# Patient Record
Sex: FEMALE | Race: WHITE | NOT HISPANIC OR LATINO | Employment: OTHER | URBAN - METROPOLITAN AREA
[De-identification: names, ages, dates, MRNs, and addresses within clinical notes are randomized per-mention and may not be internally consistent; named-entity substitution may affect disease eponyms.]

---

## 2022-04-19 ENCOUNTER — TRANSCRIBE ORDERS (OUTPATIENT)
Dept: CARDIAC REHAB | Facility: HOSPITAL | Age: 67
End: 2022-04-19
Payer: COMMERCIAL

## 2022-04-19 DIAGNOSIS — J44.9 COPD (CHRONIC OBSTRUCTIVE PULMONARY DISEASE) (H): Primary | ICD-10-CM

## 2022-04-20 ENCOUNTER — HOSPITAL ENCOUNTER (OUTPATIENT)
Dept: CARDIAC REHAB | Facility: HOSPITAL | Age: 67
Discharge: HOME | End: 2022-04-20
Attending: INTERNAL MEDICINE
Payer: MEDICARE

## 2022-04-20 DIAGNOSIS — J44.9 COPD (CHRONIC OBSTRUCTIVE PULMONARY DISEASE) (H): ICD-10-CM

## 2022-04-20 PROCEDURE — 94626 PHY/QHP OP PULM RHB W/MNTR: CPT

## 2022-04-26 ENCOUNTER — HOSPITAL ENCOUNTER (OUTPATIENT)
Dept: CARDIAC REHAB | Facility: HOSPITAL | Age: 67
Discharge: HOME | End: 2022-04-26
Attending: INTERNAL MEDICINE
Payer: MEDICARE

## 2022-04-26 PROCEDURE — 94625 PHY/QHP OP PULM RHB W/O MNTR: CPT

## 2022-05-03 ENCOUNTER — HOSPITAL ENCOUNTER (OUTPATIENT)
Dept: CARDIAC REHAB | Facility: HOSPITAL | Age: 67
Discharge: HOME | End: 2022-05-03
Attending: INTERNAL MEDICINE
Payer: MEDICARE

## 2022-05-03 PROCEDURE — 94625 PHY/QHP OP PULM RHB W/O MNTR: CPT

## 2022-05-05 ENCOUNTER — HOSPITAL ENCOUNTER (OUTPATIENT)
Dept: CARDIAC REHAB | Facility: HOSPITAL | Age: 67
Discharge: HOME | End: 2022-05-05
Attending: INTERNAL MEDICINE
Payer: MEDICARE

## 2022-05-05 PROCEDURE — 94625 PHY/QHP OP PULM RHB W/O MNTR: CPT

## 2022-05-10 ENCOUNTER — HOSPITAL ENCOUNTER (OUTPATIENT)
Dept: CARDIAC REHAB | Facility: HOSPITAL | Age: 67
Discharge: HOME | End: 2022-05-10
Attending: INTERNAL MEDICINE
Payer: MEDICARE

## 2022-05-10 PROCEDURE — 94625 PHY/QHP OP PULM RHB W/O MNTR: CPT

## 2022-05-17 ENCOUNTER — HOSPITAL ENCOUNTER (OUTPATIENT)
Dept: CARDIAC REHAB | Facility: HOSPITAL | Age: 67
Discharge: HOME | End: 2022-05-17
Attending: INTERNAL MEDICINE
Payer: MEDICARE

## 2022-05-17 PROCEDURE — 94625 PHY/QHP OP PULM RHB W/O MNTR: CPT

## 2022-05-19 ENCOUNTER — HOSPITAL ENCOUNTER (OUTPATIENT)
Dept: CARDIAC REHAB | Facility: HOSPITAL | Age: 67
Discharge: HOME | End: 2022-05-19
Attending: INTERNAL MEDICINE
Payer: MEDICARE

## 2022-05-19 PROCEDURE — 94625 PHY/QHP OP PULM RHB W/O MNTR: CPT

## 2022-05-24 ENCOUNTER — HOSPITAL ENCOUNTER (OUTPATIENT)
Dept: CARDIAC REHAB | Facility: HOSPITAL | Age: 67
Discharge: HOME | End: 2022-05-24
Attending: INTERNAL MEDICINE
Payer: MEDICARE

## 2022-05-24 PROCEDURE — 94625 PHY/QHP OP PULM RHB W/O MNTR: CPT

## 2022-05-31 ENCOUNTER — HOSPITAL ENCOUNTER (OUTPATIENT)
Dept: CARDIAC REHAB | Facility: HOSPITAL | Age: 67
Discharge: HOME | End: 2022-05-31
Attending: INTERNAL MEDICINE
Payer: MEDICARE

## 2022-05-31 PROCEDURE — 94625 PHY/QHP OP PULM RHB W/O MNTR: CPT

## 2022-06-02 ENCOUNTER — HOSPITAL ENCOUNTER (OUTPATIENT)
Dept: CARDIAC REHAB | Facility: HOSPITAL | Age: 67
Discharge: HOME | End: 2022-06-02
Attending: INTERNAL MEDICINE
Payer: MEDICARE

## 2022-06-02 PROCEDURE — 94625 PHY/QHP OP PULM RHB W/O MNTR: CPT

## 2022-06-07 ENCOUNTER — HOSPITAL ENCOUNTER (OUTPATIENT)
Dept: CARDIAC REHAB | Facility: HOSPITAL | Age: 67
Discharge: HOME | End: 2022-06-07
Attending: INTERNAL MEDICINE
Payer: MEDICARE

## 2022-06-07 PROCEDURE — 94625 PHY/QHP OP PULM RHB W/O MNTR: CPT

## 2022-06-09 ENCOUNTER — HOSPITAL ENCOUNTER (OUTPATIENT)
Dept: CARDIAC REHAB | Facility: HOSPITAL | Age: 67
Discharge: HOME | End: 2022-06-09
Attending: INTERNAL MEDICINE
Payer: MEDICARE

## 2022-06-09 PROCEDURE — 94625 PHY/QHP OP PULM RHB W/O MNTR: CPT

## 2022-06-14 ENCOUNTER — HOSPITAL ENCOUNTER (OUTPATIENT)
Dept: CARDIAC REHAB | Facility: HOSPITAL | Age: 67
Discharge: HOME | End: 2022-06-14
Attending: INTERNAL MEDICINE
Payer: MEDICARE

## 2022-06-14 PROCEDURE — 94625 PHY/QHP OP PULM RHB W/O MNTR: CPT

## 2022-06-16 ENCOUNTER — HOSPITAL ENCOUNTER (OUTPATIENT)
Dept: CARDIAC REHAB | Facility: HOSPITAL | Age: 67
Discharge: HOME | End: 2022-06-16
Attending: INTERNAL MEDICINE
Payer: MEDICARE

## 2022-06-16 PROCEDURE — 94625 PHY/QHP OP PULM RHB W/O MNTR: CPT

## 2022-06-21 ENCOUNTER — HOSPITAL ENCOUNTER (OUTPATIENT)
Dept: CARDIAC REHAB | Facility: HOSPITAL | Age: 67
Discharge: HOME | End: 2022-06-21
Attending: INTERNAL MEDICINE
Payer: MEDICARE

## 2022-06-21 PROCEDURE — 94625 PHY/QHP OP PULM RHB W/O MNTR: CPT

## 2022-06-23 ENCOUNTER — MEDICAL CORRESPONDENCE (OUTPATIENT)
Dept: CARDIAC REHAB | Facility: HOSPITAL | Age: 67
End: 2022-06-23

## 2023-08-24 ENCOUNTER — HOSPITAL ENCOUNTER (INPATIENT)
Facility: HOSPITAL | Age: 68
LOS: 2 days | Discharge: HOME | DRG: 552 | End: 2023-08-28
Attending: EMERGENCY MEDICINE | Admitting: INTERNAL MEDICINE
Payer: MEDICARE

## 2023-08-24 ENCOUNTER — APPOINTMENT (OUTPATIENT)
Dept: CT IMAGING | Facility: HOSPITAL | Age: 68
DRG: 552 | End: 2023-08-24
Attending: INTERNAL MEDICINE
Payer: MEDICARE

## 2023-08-24 DIAGNOSIS — M54.50 ACUTE RIGHT-SIDED LOW BACK PAIN, UNSPECIFIED WHETHER SCIATICA PRESENT: ICD-10-CM

## 2023-08-24 DIAGNOSIS — Z13.6 CARDIOVASCULAR SCREENING; LDL GOAL LESS THAN 160: ICD-10-CM

## 2023-08-24 DIAGNOSIS — R52 INTRACTABLE PAIN: ICD-10-CM

## 2023-08-24 DIAGNOSIS — Z71.89 ADVANCED DIRECTIVES, COUNSELING/DISCUSSION: Primary | ICD-10-CM

## 2023-08-24 PROBLEM — R93.89 ABNORMAL RADIOGRAPH: Status: ACTIVE | Noted: 2023-08-24

## 2023-08-24 PROBLEM — R09.02 HYPOXIA: Status: ACTIVE | Noted: 2017-04-09

## 2023-08-24 PROBLEM — Z87.09 PERSONAL HISTORY OF BRONCHIECTASIS: Status: ACTIVE | Noted: 2017-04-06

## 2023-08-24 PROBLEM — E78.2 MIXED HYPERLIPIDEMIA: Status: ACTIVE | Noted: 2021-11-04

## 2023-08-24 PROBLEM — M16.11 ARTHRITIS OF RIGHT HIP: Status: ACTIVE | Noted: 2020-05-11

## 2023-08-24 PROBLEM — R73.01 IMPAIRED FASTING GLUCOSE: Status: ACTIVE | Noted: 2021-11-04

## 2023-08-24 PROBLEM — E66.812 OBESITY, CLASS II, BMI 35-39.9: Status: ACTIVE | Noted: 2019-10-29

## 2023-08-24 PROBLEM — K57.30 DIVERTICULAR DISEASE OF LARGE INTESTINE: Status: ACTIVE | Noted: 2019-11-21

## 2023-08-24 PROBLEM — G47.33 OSA (OBSTRUCTIVE SLEEP APNEA): Status: ACTIVE | Noted: 2023-08-24

## 2023-08-24 PROBLEM — D12.4 BENIGN NEOPLASM OF DESCENDING COLON: Status: ACTIVE | Noted: 2019-11-25

## 2023-08-24 LAB
ALBUMIN SERPL BCG-MCNC: 3.9 G/DL (ref 3.5–5.2)
ALBUMIN UR-MCNC: 10 MG/DL
ALP SERPL-CCNC: 106 U/L (ref 35–104)
ALT SERPL W P-5'-P-CCNC: 15 U/L (ref 0–50)
ANION GAP SERPL CALCULATED.3IONS-SCNC: 9 MMOL/L (ref 7–15)
APPEARANCE UR: CLEAR
AST SERPL W P-5'-P-CCNC: 24 U/L (ref 0–45)
BACTERIA #/AREA URNS HPF: ABNORMAL /HPF
BASOPHILS # BLD AUTO: 0 10E3/UL (ref 0–0.2)
BASOPHILS NFR BLD AUTO: 0 %
BILIRUB SERPL-MCNC: 0.3 MG/DL
BILIRUB UR QL STRIP: NEGATIVE
BUN SERPL-MCNC: 15.1 MG/DL (ref 8–23)
CALCIUM SERPL-MCNC: 9.3 MG/DL (ref 8.8–10.2)
CHLORIDE SERPL-SCNC: 106 MMOL/L (ref 98–107)
CK SERPL-CCNC: 44 U/L (ref 26–192)
COLOR UR AUTO: ABNORMAL
CREAT SERPL-MCNC: 0.72 MG/DL (ref 0.51–0.95)
CRP SERPL-MCNC: <3 MG/L
DEPRECATED HCO3 PLAS-SCNC: 24 MMOL/L (ref 22–29)
EOSINOPHIL # BLD AUTO: 0.1 10E3/UL (ref 0–0.7)
EOSINOPHIL NFR BLD AUTO: 1 %
ERYTHROCYTE [DISTWIDTH] IN BLOOD BY AUTOMATED COUNT: 13.8 % (ref 10–15)
ERYTHROCYTE [SEDIMENTATION RATE] IN BLOOD BY WESTERGREN METHOD: 18 MM/HR (ref 0–30)
GFR SERPL CREATININE-BSD FRML MDRD: 90 ML/MIN/1.73M2
GLUCOSE SERPL-MCNC: 111 MG/DL (ref 70–99)
GLUCOSE UR STRIP-MCNC: NEGATIVE MG/DL
HCT VFR BLD AUTO: 40.9 % (ref 35–47)
HGB BLD-MCNC: 13 G/DL (ref 11.7–15.7)
HGB UR QL STRIP: NEGATIVE
IMM GRANULOCYTES # BLD: 0.1 10E3/UL
IMM GRANULOCYTES NFR BLD: 1 %
KETONES UR STRIP-MCNC: NEGATIVE MG/DL
LEUKOCYTE ESTERASE UR QL STRIP: ABNORMAL
LYMPHOCYTES # BLD AUTO: 1.2 10E3/UL (ref 0.8–5.3)
LYMPHOCYTES NFR BLD AUTO: 11 %
MCH RBC QN AUTO: 30.7 PG (ref 26.5–33)
MCHC RBC AUTO-ENTMCNC: 31.8 G/DL (ref 31.5–36.5)
MCV RBC AUTO: 97 FL (ref 78–100)
MONOCYTES # BLD AUTO: 0.5 10E3/UL (ref 0–1.3)
MONOCYTES NFR BLD AUTO: 5 %
MUCOUS THREADS #/AREA URNS LPF: PRESENT /LPF
NEUTROPHILS # BLD AUTO: 8.3 10E3/UL (ref 1.6–8.3)
NEUTROPHILS NFR BLD AUTO: 82 %
NITRATE UR QL: NEGATIVE
NRBC # BLD AUTO: 0 10E3/UL
NRBC BLD AUTO-RTO: 0 /100
PH UR STRIP: 5.5 [PH] (ref 5–7)
PLATELET # BLD AUTO: 204 10E3/UL (ref 150–450)
POTASSIUM SERPL-SCNC: 4.6 MMOL/L (ref 3.4–5.3)
PROT SERPL-MCNC: 6.7 G/DL (ref 6.4–8.3)
RBC # BLD AUTO: 4.23 10E6/UL (ref 3.8–5.2)
RBC URINE: 1 /HPF
SODIUM SERPL-SCNC: 139 MMOL/L (ref 136–145)
SP GR UR STRIP: >1.03 (ref 1–1.03)
SQUAMOUS EPITHELIAL: 3 /HPF
TRANSITIONAL EPI: <1 /HPF
UROBILINOGEN UR STRIP-MCNC: <2 MG/DL
WBC # BLD AUTO: 10.2 10E3/UL (ref 4–11)
WBC URINE: 2 /HPF

## 2023-08-24 PROCEDURE — 96374 THER/PROPH/DIAG INJ IV PUSH: CPT

## 2023-08-24 PROCEDURE — 250N000011 HC RX IP 250 OP 636: Performed by: INTERNAL MEDICINE

## 2023-08-24 PROCEDURE — 36415 COLL VENOUS BLD VENIPUNCTURE: CPT | Performed by: INTERNAL MEDICINE

## 2023-08-24 PROCEDURE — 96376 TX/PRO/DX INJ SAME DRUG ADON: CPT

## 2023-08-24 PROCEDURE — 86140 C-REACTIVE PROTEIN: CPT | Performed by: INTERNAL MEDICINE

## 2023-08-24 PROCEDURE — 250N000013 HC RX MED GY IP 250 OP 250 PS 637: Performed by: INTERNAL MEDICINE

## 2023-08-24 PROCEDURE — 250N000009 HC RX 250: Performed by: INTERNAL MEDICINE

## 2023-08-24 PROCEDURE — 96375 TX/PRO/DX INJ NEW DRUG ADDON: CPT

## 2023-08-24 PROCEDURE — 99285 EMERGENCY DEPT VISIT HI MDM: CPT | Mod: 25

## 2023-08-24 PROCEDURE — 80053 COMPREHEN METABOLIC PANEL: CPT | Performed by: INTERNAL MEDICINE

## 2023-08-24 PROCEDURE — 250N000013 HC RX MED GY IP 250 OP 250 PS 637: Performed by: EMERGENCY MEDICINE

## 2023-08-24 PROCEDURE — G1010 CDSM STANSON: HCPCS

## 2023-08-24 PROCEDURE — 96361 HYDRATE IV INFUSION ADD-ON: CPT

## 2023-08-24 PROCEDURE — 85025 COMPLETE CBC W/AUTO DIFF WBC: CPT | Performed by: INTERNAL MEDICINE

## 2023-08-24 PROCEDURE — 82550 ASSAY OF CK (CPK): CPT | Performed by: INTERNAL MEDICINE

## 2023-08-24 PROCEDURE — 81001 URINALYSIS AUTO W/SCOPE: CPT | Performed by: INTERNAL MEDICINE

## 2023-08-24 PROCEDURE — G0378 HOSPITAL OBSERVATION PER HR: HCPCS

## 2023-08-24 PROCEDURE — 72132 CT LUMBAR SPINE W/DYE: CPT | Mod: MF

## 2023-08-24 PROCEDURE — 258N000003 HC RX IP 258 OP 636: Performed by: EMERGENCY MEDICINE

## 2023-08-24 PROCEDURE — 250N000011 HC RX IP 250 OP 636: Mod: JZ | Performed by: INTERNAL MEDICINE

## 2023-08-24 PROCEDURE — 250N000011 HC RX IP 250 OP 636: Performed by: EMERGENCY MEDICINE

## 2023-08-24 PROCEDURE — 99223 1ST HOSP IP/OBS HIGH 75: CPT | Performed by: INTERNAL MEDICINE

## 2023-08-24 PROCEDURE — 85652 RBC SED RATE AUTOMATED: CPT | Performed by: INTERNAL MEDICINE

## 2023-08-24 RX ORDER — TIZANIDINE 2 MG/1
4 TABLET ORAL ONCE
Status: COMPLETED | OUTPATIENT
Start: 2023-08-24 | End: 2023-08-24

## 2023-08-24 RX ORDER — LIDOCAINE 4 G/G
1 PATCH TOPICAL ONCE
Status: DISCONTINUED | OUTPATIENT
Start: 2023-08-24 | End: 2023-08-24

## 2023-08-24 RX ORDER — ACETAMINOPHEN 500 MG
1000 TABLET ORAL EVERY 8 HOURS
COMMUNITY

## 2023-08-24 RX ORDER — ONDANSETRON 2 MG/ML
4 INJECTION INTRAMUSCULAR; INTRAVENOUS EVERY 6 HOURS PRN
Status: DISCONTINUED | OUTPATIENT
Start: 2023-08-24 | End: 2023-08-28 | Stop reason: HOSPADM

## 2023-08-24 RX ORDER — ACETAMINOPHEN 325 MG/1
975 TABLET ORAL 3 TIMES DAILY
Status: DISCONTINUED | OUTPATIENT
Start: 2023-08-24 | End: 2023-08-28 | Stop reason: HOSPADM

## 2023-08-24 RX ORDER — BISACODYL 10 MG
10 SUPPOSITORY, RECTAL RECTAL DAILY PRN
Status: DISCONTINUED | OUTPATIENT
Start: 2023-08-24 | End: 2023-08-28 | Stop reason: HOSPADM

## 2023-08-24 RX ORDER — LIDOCAINE 40 MG/G
CREAM TOPICAL
Status: DISCONTINUED | OUTPATIENT
Start: 2023-08-24 | End: 2023-08-28 | Stop reason: HOSPADM

## 2023-08-24 RX ORDER — HYDROMORPHONE HYDROCHLORIDE 2 MG/1
2 TABLET ORAL
Status: DISCONTINUED | OUTPATIENT
Start: 2023-08-24 | End: 2023-08-28 | Stop reason: HOSPADM

## 2023-08-24 RX ORDER — GABAPENTIN 100 MG/1
100 CAPSULE ORAL 3 TIMES DAILY
Status: DISCONTINUED | OUTPATIENT
Start: 2023-08-24 | End: 2023-08-26

## 2023-08-24 RX ORDER — HYDROMORPHONE HYDROCHLORIDE 4 MG/1
4 TABLET ORAL
Status: DISCONTINUED | OUTPATIENT
Start: 2023-08-24 | End: 2023-08-28 | Stop reason: HOSPADM

## 2023-08-24 RX ORDER — MORPHINE SULFATE 4 MG/ML
4 INJECTION, SOLUTION INTRAMUSCULAR; INTRAVENOUS ONCE
Status: COMPLETED | OUTPATIENT
Start: 2023-08-24 | End: 2023-08-24

## 2023-08-24 RX ORDER — ONDANSETRON 4 MG/1
4 TABLET, ORALLY DISINTEGRATING ORAL EVERY 6 HOURS PRN
Status: DISCONTINUED | OUTPATIENT
Start: 2023-08-24 | End: 2023-08-28 | Stop reason: HOSPADM

## 2023-08-24 RX ORDER — ROSUVASTATIN CALCIUM 10 MG/1
20 TABLET, COATED ORAL EVERY EVENING
Status: DISCONTINUED | OUTPATIENT
Start: 2023-08-24 | End: 2023-08-28 | Stop reason: HOSPADM

## 2023-08-24 RX ORDER — LIDOCAINE 50 MG/G
OINTMENT TOPICAL 4 TIMES DAILY
Status: DISCONTINUED | OUTPATIENT
Start: 2023-08-24 | End: 2023-08-28 | Stop reason: HOSPADM

## 2023-08-24 RX ORDER — ROSUVASTATIN CALCIUM 20 MG/1
20 TABLET, COATED ORAL EVERY EVENING
COMMUNITY

## 2023-08-24 RX ORDER — HYDROMORPHONE HYDROCHLORIDE 1 MG/ML
0.5 INJECTION, SOLUTION INTRAMUSCULAR; INTRAVENOUS; SUBCUTANEOUS ONCE
Status: COMPLETED | OUTPATIENT
Start: 2023-08-24 | End: 2023-08-24

## 2023-08-24 RX ORDER — IOPAMIDOL 755 MG/ML
90 INJECTION, SOLUTION INTRAVASCULAR ONCE
Status: COMPLETED | OUTPATIENT
Start: 2023-08-24 | End: 2023-08-24

## 2023-08-24 RX ORDER — ACETAMINOPHEN 325 MG/1
975 TABLET ORAL ONCE
Status: COMPLETED | OUTPATIENT
Start: 2023-08-24 | End: 2023-08-24

## 2023-08-24 RX ORDER — KETOROLAC TROMETHAMINE 15 MG/ML
15 INJECTION, SOLUTION INTRAMUSCULAR; INTRAVENOUS ONCE
Status: COMPLETED | OUTPATIENT
Start: 2023-08-24 | End: 2023-08-24

## 2023-08-24 RX ORDER — ACETAMINOPHEN 325 MG/1
975 TABLET ORAL 3 TIMES DAILY
Status: DISCONTINUED | OUTPATIENT
Start: 2023-08-24 | End: 2023-08-24

## 2023-08-24 RX ORDER — METHOCARBAMOL 500 MG/1
500 TABLET, FILM COATED ORAL 4 TIMES DAILY
Status: DISCONTINUED | OUTPATIENT
Start: 2023-08-24 | End: 2023-08-28 | Stop reason: HOSPADM

## 2023-08-24 RX ORDER — POLYETHYLENE GLYCOL 3350 17 G/17G
17 POWDER, FOR SOLUTION ORAL DAILY PRN
Status: DISCONTINUED | OUTPATIENT
Start: 2023-08-24 | End: 2023-08-28 | Stop reason: HOSPADM

## 2023-08-24 RX ORDER — KETOROLAC TROMETHAMINE 30 MG/ML
30 INJECTION, SOLUTION INTRAMUSCULAR; INTRAVENOUS ONCE
Status: CANCELLED | OUTPATIENT
Start: 2023-08-24 | End: 2023-08-24

## 2023-08-24 RX ORDER — IBUPROFEN 200 MG
800 TABLET ORAL EVERY 8 HOURS
Status: ON HOLD | COMMUNITY
End: 2023-08-28

## 2023-08-24 RX ADMIN — HYDROMORPHONE HYDROCHLORIDE 4 MG: 4 TABLET ORAL at 19:42

## 2023-08-24 RX ADMIN — KETOROLAC TROMETHAMINE 15 MG: 15 INJECTION INTRAMUSCULAR; INTRAVENOUS at 12:19

## 2023-08-24 RX ADMIN — METHOCARBAMOL 500 MG: 500 TABLET, FILM COATED ORAL at 20:24

## 2023-08-24 RX ADMIN — HYDROMORPHONE HYDROCHLORIDE 0.5 MG: 1 INJECTION, SOLUTION INTRAMUSCULAR; INTRAVENOUS; SUBCUTANEOUS at 14:14

## 2023-08-24 RX ADMIN — ACETAMINOPHEN 975 MG: 325 TABLET ORAL at 20:23

## 2023-08-24 RX ADMIN — IOPAMIDOL 90 ML: 755 INJECTION, SOLUTION INTRAVENOUS at 17:26

## 2023-08-24 RX ADMIN — GABAPENTIN 100 MG: 100 CAPSULE ORAL at 20:24

## 2023-08-24 RX ADMIN — ROSUVASTATIN CALCIUM 20 MG: 10 TABLET, FILM COATED ORAL at 20:22

## 2023-08-24 RX ADMIN — ONDANSETRON 4 MG: 2 INJECTION INTRAMUSCULAR; INTRAVENOUS at 20:31

## 2023-08-24 RX ADMIN — ACETAMINOPHEN 975 MG: 325 TABLET ORAL at 12:20

## 2023-08-24 RX ADMIN — LIDOCAINE 1 PATCH: 4 PATCH TOPICAL at 12:20

## 2023-08-24 RX ADMIN — HYDROMORPHONE HYDROCHLORIDE 1 MG: 1 INJECTION, SOLUTION INTRAMUSCULAR; INTRAVENOUS; SUBCUTANEOUS at 12:50

## 2023-08-24 RX ADMIN — LIDOCAINE: 50 OINTMENT TOPICAL at 16:30

## 2023-08-24 RX ADMIN — MORPHINE SULFATE 4 MG: 4 INJECTION, SOLUTION INTRAMUSCULAR; INTRAVENOUS at 12:17

## 2023-08-24 RX ADMIN — METHOCARBAMOL 500 MG: 500 TABLET, FILM COATED ORAL at 16:30

## 2023-08-24 RX ADMIN — SODIUM CHLORIDE, POTASSIUM CHLORIDE, SODIUM LACTATE AND CALCIUM CHLORIDE 500 ML: 600; 310; 30; 20 INJECTION, SOLUTION INTRAVENOUS at 12:19

## 2023-08-24 RX ADMIN — HYDROMORPHONE HYDROCHLORIDE 2 MG: 2 TABLET ORAL at 16:30

## 2023-08-24 RX ADMIN — ONDANSETRON 4 MG: 2 INJECTION INTRAMUSCULAR; INTRAVENOUS at 14:27

## 2023-08-24 RX ADMIN — GABAPENTIN 100 MG: 100 CAPSULE ORAL at 16:30

## 2023-08-24 RX ADMIN — UMECLIDINIUM BROMIDE AND VILANTEROL TRIFENATATE 1 PUFF: 62.5; 25 POWDER RESPIRATORY (INHALATION) at 16:30

## 2023-08-24 RX ADMIN — TIZANIDINE 4 MG: 2 TABLET ORAL at 12:20

## 2023-08-24 RX ADMIN — HYDROMORPHONE HYDROCHLORIDE 4 MG: 4 TABLET ORAL at 23:37

## 2023-08-24 ASSESSMENT — ACTIVITIES OF DAILY LIVING (ADL)
ADLS_ACUITY_SCORE: 37
DEPENDENT_IADLS:: INDEPENDENT
ADLS_ACUITY_SCORE: 35
ADLS_ACUITY_SCORE: 37

## 2023-08-24 NOTE — PHARMACY-ADMISSION MEDICATION HISTORY
Pharmacist Admission Medication History    Admission medication history is complete. The information provided in this note is only as accurate as the sources available at the time of the update.    Medication reconciliation/reorder completed by provider prior to medication history? No    Information Source(s): Patient, Family member, and CareEverywhere/SureScripts via in-person    Pertinent Information:     Changes made to PTA medication list:  Added: rosuvastatin, anoro ellipta, ibuprofen, acetaminophen  Deleted: acetaminophen/codeine, calcium-vit D, flonase, glucosamine chondroitin, claritin D, multivitamin  Changed: None    Medication Affordability:       Allergies reviewed with patient and updates made in EHR: yes    Medication History Completed By: Bibiana Sainz [facility] 8/24/2023 1:55 PM    Prior to Admission medications    Medication Sig Last Dose Taking? Auth Provider Long Term End Date   acetaminophen (TYLENOL) 500 MG tablet Take 1,000 mg by mouth every 8 hours 8/23/2023 Yes Unknown, Entered By History     ibuprofen (ADVIL/MOTRIN) 200 MG tablet Take 800 mg by mouth every 8 hours 8/24/2023 at AM Yes Unknown, Entered By History     rosuvastatin (CRESTOR) 20 MG tablet Take 20 mg by mouth every evening 8/23/2023 at PM Yes Unknown, Entered By History Yes    umeclidinium-vilanterol (ANORO ELLIPTA) 62.5-25 MCG/ACT oral inhaler Inhale 1 puff into the lungs daily 8/23/2023 at AM Yes Unknown, Entered By History

## 2023-08-24 NOTE — H&P
"[facility]    History and Physical - Hospitalist Service       Date of Admission:  8/24/2023    Assessment & Plan      Acute on chronic intractable LBP with RLE radiculopathy: acutely worsened ~48hrs after right L3-4 TF MARIANNE performed on 8/17.    -CT L Spine w/ and w/o con trast  -unable to do MRI due to false eye and apparent metal  -pain control as ordered.   -pt/ot tomorrow  -neuro surgery consult     Moderate to severe spinal canal stenosis at L2-L3, L3-L4, and L4-L5 due to disc herniations    Severe osseous foraminal stenosis on the left at L3-L4 with impingement of the left L3 nerve root (per 1/27/23 CT)  -no left sided symptoms.    Frequent urination  -check UA/UC       Diet: Regular Diet Adult    DVT Prophylaxis: Pneumatic Compression Devices  Scott Catheter: Not present  Lines: None     Cardiac Monitoring: None  Code Status: Full Code      Clinically Significant Risk Factors Present on Admission                       # Obesity: Estimated body mass index is 34.54 kg/m  as calculated from the following:    Height as of this encounter: 1.6 m (5' 3\").    Weight as of this encounter: 88.5 kg (195 lb).              Disposition Plan      Expected Discharge Date: 08/25/2023                  Tanner Pederson DO, DO  Hospitalist Service  [facility]  Securely message with [employer] (more info)  Text page via [employer] Paging/Directory     ______________________________________________________________________    Chief Complaint   Intractable back pain    History of Present Illness   Andreea Hurd is a 68 year old female who has a PMHx of chronic low back pain for which she has been followed within [facility] system by Lucius Tesfaye and Katelyn.  Presented to the ED today due to extreme back pain and can hardly stand or walk. Location low back on right side, sharp/shooting.  Has had chronic back pain but for the past 2 weeks it has been \"Bad\" but over the last week has worsened " and for the past couple days the pain has been unbearable.  Reports she feels she has to constantly urinate but no dysuria or hematuria.  She has tried ibuprofen 800 mg every 8 hours alternating w/ tylenol 1g every 8 hours and ice with no relief. Took 1 of her husbands oxycodone this morning and no relief.    The patient is s/p right L3-L4 TF MARIANNE injection with Dr. Zepeda on Thursday, August 17th at [facility] and states that she had benefit on Thursday and Friday, and her pain level was 2/10 at that time. The procedure was apparently performed due to right L3-4 disc herniation with radiculopathy.     However, when she woke up on Saturday, August 19th her pain was acutely worsened and rated as 9/10. Patient states that pain is in right thigh, right buttock to right knee. Patient states pain is worse when laying in bed at night.    Last spine imaging performed was CT L Spine (1/27/23) which at that time showed No fracture or acute osseous abnormality, Advanced L2-L3 disc degeneration. Mild to moderate L1-L2 and L3-L4 disc degeneration. Vacuum disc phenomenon noted at L2-L3, L3-L4 and L4-L5. Lumbar facet degeneration advanced. Multiple disc bulges throughout the lumbar spine which produce moderate to severe spinal canal stenosis at L2-L3, L3-L4, and L4-L5. Severe osseous foraminal stenosis on the left at L3-L4 with impingement of the left L3 nerve root. Foraminal disc and facet change produce probable mild to moderate foraminal stenoses elsewhere with nerve root abutment.      Patient denies any loss of bowel or bladder control. Denies numbness, states she has weakness in right leg which is not new. Patient states she has numbness in groin area, which not new either.        Past Medical History    Past Medical History:   Diagnosis Date    Carpal tunnel syndrome 9/17/1998    bilateral    Decreased hearing     Rt Ear    Ex-smoker     quit in 2007    Fainted 9/10/1998    several times in life    Hot flashes, menopausal  6/2001    x1year    NONSPECIFIC MEDICAL HISTORY     RT glass eye       Past Surgical History   Past Surgical History:   Procedure Laterality Date    EYE SURGERY  Age 3    artificial eye,RT/eyelid    TUBAL LIGATION  Age 29       Prior to Admission Medications   Prior to Admission Medications   Prescriptions Last Dose Informant Patient Reported? Taking?   acetaminophen (TYLENOL) 500 MG tablet 8/23/2023  Yes Yes   Sig: Take 1,000 mg by mouth every 8 hours   ibuprofen (ADVIL/MOTRIN) 200 MG tablet 8/24/2023 at AM  Yes Yes   Sig: Take 800 mg by mouth every 8 hours   rosuvastatin (CRESTOR) 20 MG tablet 8/23/2023 at PM  Yes Yes   Sig: Take 20 mg by mouth every evening   umeclidinium-vilanterol (ANORO ELLIPTA) 62.5-25 MCG/ACT oral inhaler 8/23/2023 at AM  Yes Yes   Sig: Inhale 1 puff into the lungs daily      Facility-Administered Medications: None          Physical Exam   Vital Signs: Temp: 97.1  F (36.2  C) Temp src: Temporal BP: 136/60 Pulse: 66   Resp: 21 SpO2: 94 % O2 Device: None (Room air)    Weight: 195 lbs 0 oz    Physical Examination:   General appearance - alert, non toxic  Eyes - pupils equal and reactive, sclera anicteric, artificial right eye/eyelid  Mouth - mucous membranes moist, pharynx normal without lesions  Lungs - clear to auscultation, no wheezes, rales or rhonchi, symmetric air entry  Heart - normal rate, regular rhythm, normal S1, S2, no murmurs, rubs, clicks or gallops. No peripheral edema.  Abdomen - soft, nontender, nondistended,  BS+  Neurological - a&ox3, right CVA region to top of right iliac crest TTP. No spinous process TTP.  No right hip or thigh TTP.  Sitting in wheelchair and no pain worsening with knee extension/elevation.  Unable to obtain DTR's at patella/achilles.  Severe pain when attempting to move in wheelchair.  Sharp/dull sensation with tongue blade intact to BLE dermatomes.  Skin - no c/c/p    Lab/imaging reviewed

## 2023-08-24 NOTE — CONSULTS
Care Management Initial Consult    General Information  Assessment completed with: Spouse or significant other, spouse Edwin  Type of CM/SW Visit: Initial Assessment    Primary Care Provider verified and updated as needed: Yes   Readmission within the last 30 days: no previous admission in last 30 days      Reason for Consult: discharge planning  Advance Care Planning: Advance Care Planning Reviewed: no concerns identified          Communication Assessment  Patient's communication style: spoken language (English or Bilingual)             Cognitive  Cognitive/Neuro/Behavioral: WDL                      Living Environment:   People in home: spouse  patient, spouse Edwin  Current living Arrangements: house      Able to return to prior arrangements: yes       Family/Social Support:  Care provided by: self  Provides care for: no one  Marital Status:     Edwin       Description of Support System: Supportive, Involved    Support Assessment: Adequate family and caregiver support    Current Resources:   Patient receiving home care services: No     Community Resources: None  Equipment currently used at home:    Supplies currently used at home: Other (CPAP supplies)    Employment/Financial:  Employment Status: retired        Financial Concerns:             Does the patient's insurance plan have a 3 day qualifying hospital stay waiver?  No    Lifestyle & Psychosocial Needs:  Social Determinants of Health     Tobacco Use: Low Risk  (8/24/2023)    Patient History     Smoking Tobacco Use: Never     Smokeless Tobacco Use: Never     Passive Exposure: Not on file   Alcohol Use: Not on file   Financial Resource Strain: Not on file   Food Insecurity: Not on file   Transportation Needs: Not on file   Physical Activity: Not on file   Stress: Not on file   Social Connections: Not on file   Intimate Partner Violence: Not on file   Depression: Not on file   Housing Stability: Not on file       Functional Status:  Prior to  admission patient needed assistance:   Dependent ADLs:: Independent, Ambulation-no assistive device (no assist with ambulation at baseline, has been using a cane PRN over the past 2 days.)  Dependent IADLs:: Independent  Assesssment of Functional Status: Not at baseline with mobility    Mental Health Status:          Chemical Dependency Status:                Values/Beliefs:  Spiritual, Cultural Beliefs, Sabianism Practices, Values that affect care:                 Additional Information:  Met with patient and spouse Edwin at bedside, introduced self and care management role. Patient and spouse live together in their home. She is independent with all I/ADLs at baseline. Has been using a cane over the past couple of days due to her back pain.     Unknown discharge needs at this time, CM to follow. JULISA discussed. Spouse to transport home.    Lola Billy RN

## 2023-08-24 NOTE — ED TRIAGE NOTES
Pt has problems with her L4 and L5 and back pain right side is getting worse, unable to walk.  Steroid shot given last Thursday without relief.  No loss of bowel or bladder control.  Pt had 1 vicodin  at 930 AM from her  but did not help.  Appears very uncomfortable.

## 2023-08-24 NOTE — ED PROVIDER NOTES
EMERGENCY DEPARTMENT ENCOUNTER      NAME: Andreea Hurd  AGE: 68 year old female  YOB: 1955  MRN: [id number]  EVALUATION DATE & TIME: 8/24/2023 11:43 AM    PCP: Danelle Sanchez    ED PROVIDER: Dixon Stein M.D.      Chief Complaint   Patient presents with    Back Pain         IMPRESSION  1. Acute right-sided low back pain, unspecified whether sciatica present    2. Intractable pain        PLAN  - admit to hospitalist for further care; med/surg obs    ED COURSE & MEDICAL DECISION MAKING    ED Course as of 08/24/23 1401   Thu Aug 24, 2023   1355 68yoF with history of low back pain (CT 1/23/23 with multiple bulging discs with resulting moderate-to-severe spinal canal stenosis L2-5 and severe left foraminal stenosis L3-4 with nerve root impingement) presenting with her  from home for evaluation of right low back pain. Reports about 1.5 weeks of atraumatic increase of her chronic low back pain; reports no improvement with steroid shot in clinic (this really helped about 7 months ago though). Has right lower back pain radiating down right thigh; no numbness, tingling, focal weakness, urinary retention, bowel or bladder incontinence. Took Vicodin without much relief.    Normal vitals on presentation. Uncomfortable on exam with reproducible right low back pain with no midline spinal tenderness, no overlying skin changes, mild radiation of pain down right leg but technically negative straight leg-raise bilaterally, CMS intact to BLE, no abdominal tenderness, clear lungs.    Doubt cauda equina syndrome, conus medullaris syndrome, spinal epidural abscess, spinal epidural hematoma, spinal cord/column injury, herniated disc, referred intraabdominal etiology. No spinal surgical indication at this time; MRI would not .    Given morphine, tizanidine, Toradol, Tylenol, Lidoderm, ice pack without much relief. Then given 1g Dilaudid with improvement to 3/10 pain---comfortable at rest but  with any movement attempts pain spasms out of control. Unable to discharge home in this state. She &  prefer admission for further pain control. Consulted hospitalist for admission; they agreed. Patient understood and agreed with the plan; no further questions at the time of admission.       --------------------------------------------------------------------------------   --------------------------------------------------------------------------------     11:58 AM I met with the patient for the initial interview and physical examination. Discussed plan for treatment and workup in the ED.  12:47 PM I rechecked with the patient. Patient is still in pain and wants more medication for her pain.       This patient involved a high degree of complexity in medical decision making, as significant risks were present and assessed. Recent encounters & results in medical record reviewed by me.    All workup (i.e. any EKG/labs/imaging as per charting below) reviewed and independently interpreted by me. See respective sections for details.    Broad differential considered for this patient, including but not limited to:  herniated disc, sciatica, radiculopathy, cauda equina syndrome, conus medullaris syndrome, spinal epidural hematoma, spinal epidural abscess, spinal cord/column injury, referred intraabdominal etiology, primary hip joint pathology    See additional MDM below if interested.      MEDICATIONS GIVEN IN THE EMERGENCY DEPARTMENT  Medications   Lidocaine (LIDOCARE) 4 % Patch 1 patch (1 patch Transdermal $Patch/Med Applied 8/24/23 1220)   HYDROmorphone (PF) (DILAUDID) injection 0.5 mg (has no administration in time range)   acetaminophen (TYLENOL) tablet 975 mg (975 mg Oral $Given 8/24/23 1220)   ketorolac (TORADOL) injection 15 mg (15 mg Intravenous $Given 8/24/23 1219)   morphine (PF) injection 4 mg (4 mg Intravenous $Given 8/24/23 1217)   tiZANidine (ZANAFLEX) tablet 4 mg (4 mg Oral $Given 8/24/23 1220)  "  lactated ringers BOLUS 500 mL (500 mLs Intravenous $New Bag 8/24/23 1219)   HYDROmorphone (DILAUDID) injection 1 mg (1 mg Intravenous $Given 8/24/23 1250)               =================================================================      HPI  Patient information was obtained from: Patient and spouse    Use of : N/A       Andreea Hurd is a 68 year old female with a pertinent history of HLD, arthritis right hip, and hypoxia who presents to this ED with spouse for evaluation of back pain.     Patient reports endorsing back pain that started a week and 1/2 ago. Patient has problem with her L4 and L5. She endorses back pain that \"shoots\" down the left side of her body. The first steroid shot she had worked great and helped with the pain. Her most recent steroid shot, last Thursday , did not help relief the pain. Patient has taken tylenol and ibuprofen every hour and it did not help.  Patient took 1 Vicodin at 9:30 AM this morning which again, did not relief any pain. Patient appears to be very uncomfortable. Patient denies any numbness tingling or any other associated symptoms right now.           --------------- MEDICAL HISTORY ---------------  PAST MEDICAL HISTORY:  Reviewed independently by me.  Past Medical History:   Diagnosis Date    Carpal tunnel syndrome 9/17/1998    bilateral    Decreased hearing     Rt Ear    Ex-smoker     quit in 2007    Fainted 9/10/1998    several times in life    Hot flashes, menopausal 6/2001    x1year    NONSPECIFIC MEDICAL HISTORY     RT glass eye     Patient Active Problem List   Diagnosis    Family history of early CAD    Advanced directives, counseling/discussion    CARDIOVASCULAR SCREENING; LDL GOAL LESS THAN 160    Iliotibial band tendonitis    Abnormal radiograph    Arthritis of right hip    Benign neoplasm of descending colon    Diverticular disease of large intestine    Mixed hyperlipidemia    Hypoxia    Impaired fasting glucose    Obesity, Class II, BMI " 35-39.9    One eye: total vision impairment; other eye: near-normal vision    RASHEED (obstructive sleep apnea)    Osteopenia    Personal history of bronchiectasis    Intractable pain    Acute right-sided low back pain, unspecified whether sciatica present       PAST SURGICAL HISTORY:  Reviewed independently by me.  Past Surgical History:   Procedure Laterality Date    EYE SURGERY  Age 3    artificial eye,RT/eyelid    TUBAL LIGATION  Age 29       CURRENT MEDICATIONS:    Reviewed independently by me.    Current Facility-Administered Medications:     HYDROmorphone (PF) (DILAUDID) injection 0.5 mg, 0.5 mg, Intravenous, Once, Dixon Stein MD    Lidocaine (LIDOCARE) 4 % Patch 1 patch, 1 patch, Transdermal, Once, Dixon Stein MD, 1 patch at 08/24/23 1220    Current Outpatient Medications:     acetaminophen (TYLENOL) 500 MG tablet, Take 1,000 mg by mouth every 8 hours, Disp: , Rfl:     ibuprofen (ADVIL/MOTRIN) 200 MG tablet, Take 800 mg by mouth every 8 hours, Disp: , Rfl:     rosuvastatin (CRESTOR) 20 MG tablet, Take 20 mg by mouth every evening, Disp: , Rfl:     umeclidinium-vilanterol (ANORO ELLIPTA) 62.5-25 MCG/ACT oral inhaler, Inhale 1 puff into the lungs daily, Disp: , Rfl:     ALLERGIES:  Reviewed independently by me.  No Known Allergies    FAMILY HISTORY:  Reviewed independently by me.  Family History   Problem Relation Age of Onset    Heart Disease Father         MI before age 55 years    Heart Disease Brother     Heart Disease Brother        SOCIAL HISTORY:   Reviewed independently by me.  Social History     Socioeconomic History    Marital status:    Tobacco Use    Smoking status: Never    Smokeless tobacco: Never   Substance and Sexual Activity    Alcohol use: Yes     Comment: occ.    Drug use: No    Sexual activity: Yes     Partners: Male       --------------- PHYSICAL EXAM ---------------  Nursing notes and vitals independently reviewed by me.  VITALS:  Vitals:    08/24/23 1244 08/24/23 1304  08/24/23 1315 08/24/23 1333   BP: (!) 154/91 135/62 121/59 136/60   Pulse: 63 59 61 66   Resp:       Temp:       TempSrc:       SpO2: 91% 94% 93% 94%   Weight:       Height:           PHYSICAL EXAM:    General:  alert, interactive, no distress  Eyes:  conjunctivae clear, conjugate gaze  HENT:  atraumatic, nose with no rhinorrhea, oropharynx clear  Neck:  no meningismus  Cardiovascular:  HR 60s during exam, regular rhythm, no murmurs, brisk cap refill, 2+ DP & PT pulses bilaterally  Chest:  no chest wall tenderness  Pulmonary:  no stridor, normal phonation, normal work of breathing, clear lungs bilaterally  Abdomen:  soft, nondistended, nontender  :  no CVA tenderness  Back:  no midline spinal tenderness, right low back tenderness with muscle spasm & no overlying skin changes, negative straight leg-raise bilaterally  Musculoskeletal:  no pretibial edema, no calf tenderness, no specific pain with ROM of joints to BLE  Skin:  warm, dry, no rash  Neuro:  awake, alert, answers questions appropriately, follows commands, moves all limbs, 5/5 strength to all extremities with sensation to light touch intact, no ankle clonus  Psych:  calm, normal affect                  --------------- ADDITIONAL MDM ---------------  History:  - I considered systemic symptoms of the presenting illness.  - Supplemental history from:       -- see above charting, if applicable: patient, family ()  - External Record(s) reviewed:       -- see above charting, if applicable       -- Inpatient/outpatient record (clinic visit 3/1/23), prior labs (blood 3/23/23), prior imaging (CT 1/27/23)    Workup:  - Chart documentation above includes differential considered and any EKGs or imaging independently interpreted by provider.  - In additional to work up documented, I considered the following work up:       -- see above charting, if applicable    External Consultation:  - Discussion of management with another provider:       -- see above charting,  if applicable    Complicating Factors:  - Care impacted by chronic illness:       -- see above MDM, past medical history, & problem list  - Care affected by social determinants of health:       -- see above social history       -- Access to Affordable Healthcare    Disposition Considerations:  - Admit         I, Sherlyn Plata, am serving as a scribe to document services personally performed by Dr. Dixon Stein based on my observation and the provider's statements to me. I, Dixon Stein MD attest that Sherlyn Plata is acting in a scribe capacity, has observed my performance of the services and has documented them in accordance with my direction.      Dixon Stein MD  08/24/23  Emergency Medicine  [facility] EMERGENCY DEPARTMENT  [address] [place] [id number] [phone]  Dept: [phone]     Dixon Stein MD  08/24/23 1341

## 2023-08-25 ENCOUNTER — APPOINTMENT (OUTPATIENT)
Dept: ULTRASOUND IMAGING | Facility: HOSPITAL | Age: 68
DRG: 552 | End: 2023-08-25
Attending: INTERNAL MEDICINE
Payer: MEDICARE

## 2023-08-25 LAB
ALBUMIN SERPL BCG-MCNC: 3.8 G/DL (ref 3.5–5.2)
ALP SERPL-CCNC: 101 U/L (ref 35–104)
ALT SERPL W P-5'-P-CCNC: 16 U/L (ref 0–50)
ANION GAP SERPL CALCULATED.3IONS-SCNC: 7 MMOL/L (ref 7–15)
AST SERPL W P-5'-P-CCNC: 20 U/L (ref 0–45)
BASOPHILS # BLD AUTO: 0 10E3/UL (ref 0–0.2)
BASOPHILS NFR BLD AUTO: 0 %
BILIRUB SERPL-MCNC: 0.3 MG/DL
BUN SERPL-MCNC: 12.8 MG/DL (ref 8–23)
CALCIUM SERPL-MCNC: 9 MG/DL (ref 8.8–10.2)
CHLORIDE SERPL-SCNC: 103 MMOL/L (ref 98–107)
CREAT SERPL-MCNC: 0.83 MG/DL (ref 0.51–0.95)
DEPRECATED HCO3 PLAS-SCNC: 29 MMOL/L (ref 22–29)
EOSINOPHIL # BLD AUTO: 0.1 10E3/UL (ref 0–0.7)
EOSINOPHIL NFR BLD AUTO: 2 %
ERYTHROCYTE [DISTWIDTH] IN BLOOD BY AUTOMATED COUNT: 13.9 % (ref 10–15)
GFR SERPL CREATININE-BSD FRML MDRD: 76 ML/MIN/1.73M2
GLUCOSE SERPL-MCNC: 91 MG/DL (ref 70–99)
HCT VFR BLD AUTO: 39.2 % (ref 35–47)
HGB BLD-MCNC: 12.3 G/DL (ref 11.7–15.7)
IMM GRANULOCYTES # BLD: 0 10E3/UL
IMM GRANULOCYTES NFR BLD: 0 %
LYMPHOCYTES # BLD AUTO: 1.8 10E3/UL (ref 0.8–5.3)
LYMPHOCYTES NFR BLD AUTO: 23 %
MCH RBC QN AUTO: 30.6 PG (ref 26.5–33)
MCHC RBC AUTO-ENTMCNC: 31.4 G/DL (ref 31.5–36.5)
MCV RBC AUTO: 98 FL (ref 78–100)
MONOCYTES # BLD AUTO: 0.6 10E3/UL (ref 0–1.3)
MONOCYTES NFR BLD AUTO: 9 %
NEUTROPHILS # BLD AUTO: 5 10E3/UL (ref 1.6–8.3)
NEUTROPHILS NFR BLD AUTO: 66 %
NRBC # BLD AUTO: 0 10E3/UL
NRBC BLD AUTO-RTO: 0 /100
PLATELET # BLD AUTO: 205 10E3/UL (ref 150–450)
POTASSIUM SERPL-SCNC: 4.3 MMOL/L (ref 3.4–5.3)
PROT SERPL-MCNC: 6.4 G/DL (ref 6.4–8.3)
RBC # BLD AUTO: 4.02 10E6/UL (ref 3.8–5.2)
SODIUM SERPL-SCNC: 139 MMOL/L (ref 136–145)
WBC # BLD AUTO: 7.5 10E3/UL (ref 4–11)

## 2023-08-25 PROCEDURE — 250N000009 HC RX 250: Performed by: INTERNAL MEDICINE

## 2023-08-25 PROCEDURE — 250N000013 HC RX MED GY IP 250 OP 250 PS 637: Performed by: INTERNAL MEDICINE

## 2023-08-25 PROCEDURE — 36415 COLL VENOUS BLD VENIPUNCTURE: CPT | Performed by: INTERNAL MEDICINE

## 2023-08-25 PROCEDURE — 250N000013 HC RX MED GY IP 250 OP 250 PS 637: Performed by: NURSE PRACTITIONER

## 2023-08-25 PROCEDURE — 99232 SBSQ HOSP IP/OBS MODERATE 35: CPT | Performed by: INTERNAL MEDICINE

## 2023-08-25 PROCEDURE — 85025 COMPLETE CBC W/AUTO DIFF WBC: CPT | Performed by: INTERNAL MEDICINE

## 2023-08-25 PROCEDURE — 80053 COMPREHEN METABOLIC PANEL: CPT | Performed by: INTERNAL MEDICINE

## 2023-08-25 PROCEDURE — 250N000011 HC RX IP 250 OP 636: Mod: JZ | Performed by: INTERNAL MEDICINE

## 2023-08-25 PROCEDURE — 76770 US EXAM ABDO BACK WALL COMP: CPT

## 2023-08-25 PROCEDURE — G0378 HOSPITAL OBSERVATION PER HR: HCPCS

## 2023-08-25 PROCEDURE — 96375 TX/PRO/DX INJ NEW DRUG ADDON: CPT

## 2023-08-25 PROCEDURE — 250N000012 HC RX MED GY IP 250 OP 636 PS 637: Performed by: NURSE PRACTITIONER

## 2023-08-25 PROCEDURE — 99222 1ST HOSP IP/OBS MODERATE 55: CPT | Performed by: NURSE PRACTITIONER

## 2023-08-25 RX ORDER — METHYLPREDNISOLONE 4 MG/1
4 TABLET ORAL
Status: DISCONTINUED | OUTPATIENT
Start: 2023-08-26 | End: 2023-08-28

## 2023-08-25 RX ORDER — METHYLPREDNISOLONE 4 MG/1
4 TABLET ORAL
Status: COMPLETED | OUTPATIENT
Start: 2023-08-26 | End: 2023-08-27

## 2023-08-25 RX ORDER — METHYLPREDNISOLONE 4 MG/1
4 TABLET ORAL AT BEDTIME
Status: DISCONTINUED | OUTPATIENT
Start: 2023-08-27 | End: 2023-08-28

## 2023-08-25 RX ORDER — METHYLPREDNISOLONE 4 MG/1
8 TABLET ORAL AT BEDTIME
Status: COMPLETED | OUTPATIENT
Start: 2023-08-25 | End: 2023-08-26

## 2023-08-25 RX ORDER — METHYLPREDNISOLONE 4 MG/1
4 TABLET ORAL ONCE
Status: COMPLETED | OUTPATIENT
Start: 2023-08-25 | End: 2023-08-25

## 2023-08-25 RX ORDER — METHYLPREDNISOLONE 4 MG/1
8 TABLET ORAL ONCE
Status: COMPLETED | OUTPATIENT
Start: 2023-08-25 | End: 2023-08-25

## 2023-08-25 RX ORDER — HYDROXYZINE HYDROCHLORIDE 25 MG/1
25 TABLET, FILM COATED ORAL EVERY 6 HOURS PRN
Status: DISCONTINUED | OUTPATIENT
Start: 2023-08-25 | End: 2023-08-28 | Stop reason: HOSPADM

## 2023-08-25 RX ORDER — CEFTRIAXONE 1 G/1
1 INJECTION, POWDER, FOR SOLUTION INTRAMUSCULAR; INTRAVENOUS EVERY 24 HOURS
Status: DISCONTINUED | OUTPATIENT
Start: 2023-08-25 | End: 2023-08-28

## 2023-08-25 RX ORDER — HYDROXYZINE HYDROCHLORIDE 25 MG/1
50 TABLET, FILM COATED ORAL EVERY 6 HOURS PRN
Status: DISCONTINUED | OUTPATIENT
Start: 2023-08-25 | End: 2023-08-28 | Stop reason: HOSPADM

## 2023-08-25 RX ADMIN — HYDROMORPHONE HYDROCHLORIDE 4 MG: 4 TABLET ORAL at 03:53

## 2023-08-25 RX ADMIN — LIDOCAINE: 50 OINTMENT TOPICAL at 20:18

## 2023-08-25 RX ADMIN — UMECLIDINIUM BROMIDE AND VILANTEROL TRIFENATATE 1 PUFF: 62.5; 25 POWDER RESPIRATORY (INHALATION) at 08:39

## 2023-08-25 RX ADMIN — LIDOCAINE: 50 OINTMENT TOPICAL at 17:01

## 2023-08-25 RX ADMIN — GABAPENTIN 100 MG: 100 CAPSULE ORAL at 08:38

## 2023-08-25 RX ADMIN — HYDROXYZINE HYDROCHLORIDE 25 MG: 25 TABLET, FILM COATED ORAL at 17:07

## 2023-08-25 RX ADMIN — METHYLPREDNISOLONE 4 MG: 4 TABLET ORAL at 11:02

## 2023-08-25 RX ADMIN — ACETAMINOPHEN 975 MG: 325 TABLET ORAL at 13:34

## 2023-08-25 RX ADMIN — CEFTRIAXONE SODIUM 1 G: 1 INJECTION, POWDER, FOR SOLUTION INTRAMUSCULAR; INTRAVENOUS at 17:00

## 2023-08-25 RX ADMIN — METHOCARBAMOL 500 MG: 500 TABLET, FILM COATED ORAL at 15:35

## 2023-08-25 RX ADMIN — ROSUVASTATIN CALCIUM 20 MG: 10 TABLET, FILM COATED ORAL at 20:13

## 2023-08-25 RX ADMIN — ACETAMINOPHEN 975 MG: 325 TABLET ORAL at 08:38

## 2023-08-25 RX ADMIN — HYDROMORPHONE HYDROCHLORIDE 4 MG: 4 TABLET ORAL at 20:14

## 2023-08-25 RX ADMIN — LIDOCAINE: 50 OINTMENT TOPICAL at 08:38

## 2023-08-25 RX ADMIN — METHYLPREDNISOLONE 8 MG: 4 TABLET ORAL at 20:14

## 2023-08-25 RX ADMIN — HYDROMORPHONE HYDROCHLORIDE 2 MG: 2 TABLET ORAL at 17:07

## 2023-08-25 RX ADMIN — GABAPENTIN 100 MG: 100 CAPSULE ORAL at 13:34

## 2023-08-25 RX ADMIN — HYDROMORPHONE HYDROCHLORIDE 2 MG: 2 TABLET ORAL at 15:35

## 2023-08-25 RX ADMIN — HYDROMORPHONE HYDROCHLORIDE 4 MG: 4 TABLET ORAL at 11:03

## 2023-08-25 RX ADMIN — METHOCARBAMOL 500 MG: 500 TABLET, FILM COATED ORAL at 20:13

## 2023-08-25 RX ADMIN — METHOCARBAMOL 500 MG: 500 TABLET, FILM COATED ORAL at 11:04

## 2023-08-25 RX ADMIN — ACETAMINOPHEN 975 MG: 325 TABLET ORAL at 20:13

## 2023-08-25 RX ADMIN — GABAPENTIN 100 MG: 100 CAPSULE ORAL at 20:13

## 2023-08-25 RX ADMIN — METHOCARBAMOL 500 MG: 500 TABLET, FILM COATED ORAL at 08:38

## 2023-08-25 RX ADMIN — METHYLPREDNISOLONE 8 MG: 4 TABLET ORAL at 11:06

## 2023-08-25 RX ADMIN — HYDROMORPHONE HYDROCHLORIDE 4 MG: 4 TABLET ORAL at 08:38

## 2023-08-25 ASSESSMENT — ACTIVITIES OF DAILY LIVING (ADL)
ADLS_ACUITY_SCORE: 37

## 2023-08-25 NOTE — CARE PLAN
PRIMARY DIAGNOSIS: ACUTE PAIN  OUTPATIENT/OBSERVATION GOALS TO BE MET BEFORE DISCHARGE:  1. Pain Status: Improved-controlled with oral pain medications.    2. Return to near baseline physical activity: No    3. Cleared for discharge by consultants (if involved): No    Discharge Planner Nurse   Safe discharge environment identified: No  Barriers to discharge: Yes Pt still have pain and difficulty moving around.       Entered by: Beckie Willis RN 08/24/2023 10:39 PM     Please review provider order for any additional goals.   Nurse to notify provider when observation goals have been met and patient is ready for discharge.

## 2023-08-25 NOTE — PLAN OF CARE
PRIMARY DIAGNOSIS: GENERALIZED WEAKNESS    OUTPATIENT/OBSERVATION GOALS TO BE MET BEFORE DISCHARGE  1. Orthostatic performed: No    2. Tolerating PO medications: Yes    3. Return to near baseline physical activity: No    4. Cleared for discharge by consultants (if involved): No    Discharge Planner Nurse   Safe discharge environment identified: No  Barriers to discharge: Yes       Entered by: Alem Hernandez RN 08/24/2023 11:43 PM     Please review provider order for any additional goals.   Nurse to notify provider when observation goals have been met and patient is ready for discharge.Goal Outcome Evaluation:

## 2023-08-25 NOTE — ED NOTES
"[facility] ED Handoff Report    ED Chief Complaint: Back pain.    ED Diagnosis:  (M54.50) Acute right-sided low back pain, unspecified whether sciatica present  Comment: Increased pain after getting steroid injection.   Plan: PO pain control.     (R52) Intractable pain  Comment: IV Dilaudid, Toradol, Morphine.   Plan: Transition to PO pain management.        PMH:    Past Medical History:   Diagnosis Date    Carpal tunnel syndrome 9/17/1998    bilateral    Decreased hearing     Rt Ear    Ex-smoker     quit in 2007    Fainted 9/10/1998    several times in life    Hot flashes, menopausal 6/2001    x1year    NONSPECIFIC MEDICAL HISTORY     RT glass eye        Code Status:  Full Code     Falls Risk: Yes Band: Applied    Current Living Situation/Residence: lives with a significant other     Elimination Status: Continent: Yes     Activity Level: SBA with wheel chair or walker.     Patients Preferred Language:  English     Needed: No    Vital Signs:  /58   Pulse 54   Temp 97.1  F (36.2  C) (Temporal)   Resp 16   Ht 1.6 m (5' 3\")   Wt 88.5 kg (195 lb)   SpO2 93%   BMI 34.54 kg/m       Cardiac Rhythm: SR/SB.     Pain Score: 3/10    Is the Patient Confused:  No    Last Food or Drink: 08/24/23 at 1800    Focused Assessment:  Pt denies fall. Over last 2-3 days Pt having severe pain in lower right back, radiating to right hip and groin, and shooting down her leg. CMS is intact in all extremities.     Tests Performed: Done: Labs and Imaging    Treatments Provided:  Pain control.     Family Dynamics/Concerns: No    Family Updated On Visitor Policy: Yes    Plan of Care Communicated to Family: Yes    Who Was Updated about Plan of Care: Pt and .     Belongings Checklist Done and Signed by Patient: Yes    Medications sent with patient: Elipta, Lido ointment.     Covid: asymptomatic , Not tested.     Last narcotic at 1942.   Pt able to stand, pivot and take a few steps, but has been primarily been " using wheel chair to get around during this hospitalization.     RN: Andrew Scott RN 8/24/2023 7:25 PM

## 2023-08-25 NOTE — CONSULTS
NEUROSURGERY CONSULTATION NOTE    Andreea Hurd   [address] [place] [address]  68 year old female  Admission Date/Time: 8/24/2023 11:43 AM  Primary Care Provider: Meek Tesfaye  [facility] Attending Physician: Tom Cheatham MD    Neurosurgery was asked to see this patient by Dr Pederson for intractable pain.     PROBLEM LIST:  Principal Problem:    Intractable pain  Active Problems:    Acute right-sided low back pain, unspecified whether sciatica present       Neurosurgery Attending: The patient's clinical examination, laboratory data, and plan was discussed with Dr Geiger.     CONSULTATION ASSESSMENT AND PLAN:  Patient arrives with one week of low pelvic pain, right groin pain, right anterior thigh pain stopping at the knee. No back or buttock pain. Lumbar CT reviewed. Multilevel degenerative finding's, unchanged from January 2023 CT. While some of this pathology could cause pain, would recommend ruling out other sources of pain. Recently had L3-4 TFESI on the right and it has not helped her pain. Defer to medicine to further evaluate right kidney lesions, recent diarrhea, urinary frequency. Will add medrol dose pack and vistaril. Patient has appointment on Tuesday with her spine provider at [facility]. If no improvement in pain while here at [facility] could consider L4-5 injection although patient does not necessary complain of symptoms in this dermatome despite pathology. No urgent, emergent surgical intervention indicated at this time. Would focus cares on pain management and physical therapy. CT myelogram would likely be helpful to further evaluate spinal compression but would need to be arranged OP. Flexion, extension also likely helpful to evaluate for any instability with noted anterolisthesis.    HPI:  Andreea Hurd is a 68 year old admitted to [facility] for one week of pelvic pain, radiating to her right groin and right thigh stopping at her knee. Denies numbness or tingling.  Reports urinary frequency. UA appears negative. Denies incontinence of urine or stool. Reports normal BM for the last week but had been having some diarrhea. CT lumbar unchanged from January 2023. Grade 1 anterolisthesis L4 on L5 3-4 mm. Mild curvature to spine. Mild listhesis L3 on 4. Moderate disc height loss at many levels. Facet arthropathy at many levels. Moderate to severe canal stenosis L4-5 although clinically patient does not seem symptomatic from this level. Moderate to severe spinal stenosis L2-3, L3-4. Moderate left L3-4 foraminal stenosis and moderate bilateral foraminal stenosis L4-5 greater on the right. Also noted multiple renal lesions on the right, largest 37 mm, likely cysts. Marialuisa reports one week of pain without trauma. Similar pain last winter that improved after injection right L3-4 TFESI 2/2/2023 with Dr Tesfaye and Dr Zepeda. Also had right L3-4 TFESI 8/17/2023 and had a few days of some relief but not as good as last winter. Denies back pain, buttocks pain or left leg pain. Gabapentin, muscle relaxer, tylenol and oral pain meds have been ordered. She has follow up appointment with Dr Tesfaye on Tuesday. Question benefit of L4-5 injection as patient has pathology at this level but does not seem symptomatic. Recommend flexion, extension XR at some point. Would recommend further diagnostic evaluation of kidney cysts. 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     Past Medical History:   Diagnosis Date    Carpal tunnel syndrome 9/17/1998    bilateral    Decreased hearing     Rt Ear    Ex-smoker     quit in 2007    Fainted 9/10/1998    several times in life    Hot flashes, menopausal 6/2001    x1year    NONSPECIFIC MEDICAL HISTORY     RT glass eye     Past Surgical History:   Procedure Laterality Date    EYE SURGERY  Age 3    artificial eye,RT/eyelid    TUBAL LIGATION  Age 29       REVIEW OF SYSTEMS:   Negative with exception of HPI     MEDICATIONS:    No current outpatient medications on file.  "        ALLERGIES/SENSITIVITIES:     No Known Allergies    PERTINENT SOCIAL HISTORY: personally reviewed   Social History     Socioeconomic History    Marital status:      Spouse name: None    Number of children: None    Years of education: None    Highest education level: None   Tobacco Use    Smoking status: Never    Smokeless tobacco: Never   Substance and Sexual Activity    Alcohol use: Yes     Comment: occ.    Drug use: No    Sexual activity: Yes     Partners: Male         FAMILY HISTORY:  Family History   Problem Relation Age of Onset    Heart Disease Father         MI before age 55 years    Heart Disease Brother     Heart Disease Brother         PHYSICAL EXAM:   Constitutional: /65 (BP Location: Left arm)   Pulse 79   Temp 97.8  F (36.6  C) (Oral)   Resp 18   Ht 1.6 m (5' 3\")   Wt 88.5 kg (195 lb)   SpO2 92%   BMI 34.54 kg/m       Mental Status: Tearful. Appears uncomfortable. Speech is fluent.  Recent and remote memory are intact.       Motor: Normal bulk and tone all muscle groups of upper and lower extremities.    Strength:   Full strength in LE  Strong DF/PF  Able to lift both legs off the bed and hold      Sensory: Sensation intact bilaterally to light touch.    IMAGING:  I personally reviewed all radiographic images     COLIN Zapata CNP  [facility] Neurosurgery        Cc:   No referring provider defined for this encounter.    Meek Tesfaye  [unfilled]            "

## 2023-08-25 NOTE — PROGRESS NOTES
Care Management Follow Up    Length of Stay (days): 0    Expected Discharge Date: 08/26/2023     Concerns to be Addressed: pain management PT/OT recommendations     Patient plan of care discussed at interdisciplinary rounds: Yes    Anticipated Discharge Disposition:  TBD     Anticipated Discharge Services:    Anticipated Discharge DME:      Patient/family educated on Medicare website which has current facility and service quality ratings:    Education Provided on the Discharge Plan:    Patient/Family in Agreement with the Plan:      Referrals Placed by CM/SW:  None at this time  Private pay costs discussed: Not applicable    Additional Information:  Social History per previous CM note:  Patient and spouse live together in their home. She is independent with all I/ADLs at baseline. Has been using a cane over the past couple of days due to her back pain.      Pt was seen by neurosurgery for intractable pain and per their note no emergent surgical treatment is needed at this time and to currently focus on pain management and PT.     CM will await for PT/OT recommendations.    Clare Carrillo RN

## 2023-08-25 NOTE — PLAN OF CARE
PRIMARY DIAGNOSIS: ACUTE PAIN  OUTPATIENT/OBSERVATION GOALS TO BE MET BEFORE DISCHARGE:  1. Pain Status: Improved-controlled with oral pain medications.    2. Return to near baseline physical activity: No    3. Cleared for discharge by consultants (if involved): No    Discharge Planner Nurse   Safe discharge environment identified: No  Barriers to discharge: Yes       Entered by: Yared Way RN 08/25/2023 4:32 PM     Please review provider order for any additional goals.   Nurse to notify provider when observation goals have been met and patient is ready for discharge.Goal Outcome Evaluation:

## 2023-08-25 NOTE — PROGRESS NOTES
"[facility]    Medicine Progress Note - Hospitalist Service    Date of Admission:  8/24/2023    Assessment & Plan   Andreea Hurd is a 68-year-old woman with history of hyperlipidemia, RASHEED, bronchiectasis, obesity, multilevel lumbar DJD and grade 1 anterolisthesis of L4 on L5 admitted on 8/24/2023 with acute on chronic low back pain with right lower extremity radiculopathy.      She received L3-4 epidural steroid injection on 8/17/2023.  Lumbar CT revealed lumbar DJD with grade 1 anterolisthesis of L4 on L5.    Acute on chronic low back pain with right lower extremity radiculopathy  Multilevel lumbar DJD with grade 1 anterolisthesis of L4 on L5.  No indication for surgical intervention at this time as per neurosurgery service.  -Unable to do MRI due to false eye and apparent metal  -Continue methylprednisolone as scheduled by neurosurgery service  -Continue Vistaril  -Consider L4-L5 injection if there is no improvement.  -Outpatient CT myelogram per neurosurgery service  -Neurosurgery [id number] assistance      Suspected UTI  Frequent urination  Urinalysis suggestive of UTI  -Follow-up urine culture  -Start IV ceftriaxone    ? Right renal lesion  -Reported by neurosurgeon on review of images  -BMP is unremarkable  -Follow-up renal ultrasound       Diet: Regular Diet Adult    DVT Prophylaxis: Pneumatic Compression Devices  Scott Catheter: Not present  Lines: None     Cardiac Monitoring: None  Code Status: Full Code      Clinically Significant Risk Factors Present on Admission                       # Obesity: Estimated body mass index is 34.54 kg/m  as calculated from the following:    Height as of this encounter: 1.6 m (5' 3\").    Weight as of this encounter: 88.5 kg (195 lb).              Disposition Plan      Expected Discharge Date: 08/26/2023    Discharge Delays: OT New Consult needed  PT New Consult needed  Specialist Consult (enter specialist & decision needed in " comments)  Destination: home with family            Mateuszdelvisopal Cheatham MD  Hospitalist Service  [facility]  Securely message with [employer] (more info)  Text page via [employer] Paging/Directory   ______________________________________________________________________    Interval History   She complains of back pain.  Back pain persisted despite receiving epidural steroid injection prior to admission.       Physical Exam   Vital Signs: Temp: 98.1  F (36.7  C) Temp src: Oral BP: 112/53 Pulse: 68   Resp: 18 SpO2: 90 % O2 Device: None (Room air)    Weight: 195 lbs 0 oz    General appearance: Obese, awake, Alert, Cooperative, not in any obvious distress and appears stated age   HEENT: Normocephalic, atraumatic, conjunctiva clear without icterus and ears without discharge  Lungs: Clear to auscultation bilaterally, no wheezing, good air exchange, normal work of breathing  Cardiovascular: Regular Rate and Rythm, normal apical impulse, normal S1 and S2, no lower extremity edema bilaterally  Abdomen: Soft, non-tender and Non-distended, active bowel sounds  Skin: Skin color, texture normal and bruising or bleeding. No rashes or lesions over face, neck, arms and legs, turgor normal.  Musculoskeletal: No bony deformities or joint tenderness. Normal ROM upon flexion & extension.   Neurologic: Alert & Oriented X 3, Facial symmetry preserved and upper & lower extremities moving well with symmetry  Psychiatric: Calm, normal eye contact and normal affect      Medical Decision Making       40 MINUTES SPENT BY ME on the date of service doing chart review, history, exam, documentation & further activities per the note.      Data

## 2023-08-25 NOTE — PLAN OF CARE
PRIMARY DIAGNOSIS: ACUTE PAIN  OUTPATIENT/OBSERVATION GOALS TO BE MET BEFORE DISCHARGE:  1. Pain Status: No improvement noted. Consider adjustment in pain regimen.    2. Return to near baseline physical activity: No    3. Cleared for discharge by consultants (if involved): No    Discharge Planner Nurse   Safe discharge environment identified: No  Barriers to discharge: Yes       Entered by: Yared Way RN 08/25/2023 11:47 AM     Please review provider order for any additional goals.   Nurse to notify provider when observation goals have been met and patient is ready for discharge.Goal Outcome Evaluation:

## 2023-08-25 NOTE — PLAN OF CARE
PRIMARY DIAGNOSIS: ACUTE PAIN  OUTPATIENT/OBSERVATION GOALS TO BE MET BEFORE DISCHARGE:  1. Pain Status: No improvement noted. Consider adjustment in pain regimen.    2. Return to near baseline physical activity: No    3. Cleared for discharge by consultants (if involved): No    Discharge Planner Nurse   Safe discharge environment identified: No  Barriers to discharge: Yes       Entered by: Yared Way RN 08/25/2023 9:25 AM     Please review provider order for any additional goals.   Nurse to notify provider when observation goals have been met and patient is ready for discharge.Goal Outcome Evaluation:

## 2023-08-26 ENCOUNTER — APPOINTMENT (OUTPATIENT)
Dept: PHYSICAL THERAPY | Facility: HOSPITAL | Age: 68
DRG: 552 | End: 2023-08-26
Attending: INTERNAL MEDICINE
Payer: MEDICARE

## 2023-08-26 ENCOUNTER — APPOINTMENT (OUTPATIENT)
Dept: OCCUPATIONAL THERAPY | Facility: HOSPITAL | Age: 68
DRG: 552 | End: 2023-08-26
Attending: INTERNAL MEDICINE
Payer: MEDICARE

## 2023-08-26 PROCEDURE — 250N000011 HC RX IP 250 OP 636: Mod: JZ | Performed by: INTERNAL MEDICINE

## 2023-08-26 PROCEDURE — 97161 PT EVAL LOW COMPLEX 20 MIN: CPT | Mod: GP

## 2023-08-26 PROCEDURE — 250N000013 HC RX MED GY IP 250 OP 250 PS 637: Performed by: INTERNAL MEDICINE

## 2023-08-26 PROCEDURE — G0378 HOSPITAL OBSERVATION PER HR: HCPCS

## 2023-08-26 PROCEDURE — 120N000001 HC R&B MED SURG/OB

## 2023-08-26 PROCEDURE — 97535 SELF CARE MNGMENT TRAINING: CPT | Mod: GO

## 2023-08-26 PROCEDURE — 250N000013 HC RX MED GY IP 250 OP 250 PS 637: Performed by: NURSE PRACTITIONER

## 2023-08-26 PROCEDURE — 97165 OT EVAL LOW COMPLEX 30 MIN: CPT | Mod: GO

## 2023-08-26 PROCEDURE — 99232 SBSQ HOSP IP/OBS MODERATE 35: CPT | Performed by: INTERNAL MEDICINE

## 2023-08-26 PROCEDURE — 250N000012 HC RX MED GY IP 250 OP 636 PS 637: Performed by: NURSE PRACTITIONER

## 2023-08-26 PROCEDURE — 96376 TX/PRO/DX INJ SAME DRUG ADON: CPT

## 2023-08-26 PROCEDURE — 97530 THERAPEUTIC ACTIVITIES: CPT | Mod: GP

## 2023-08-26 RX ORDER — GABAPENTIN 300 MG/1
300 CAPSULE ORAL 3 TIMES DAILY
Status: DISCONTINUED | OUTPATIENT
Start: 2023-08-26 | End: 2023-08-28 | Stop reason: HOSPADM

## 2023-08-26 RX ADMIN — HYDROXYZINE HYDROCHLORIDE 25 MG: 25 TABLET, FILM COATED ORAL at 18:33

## 2023-08-26 RX ADMIN — HYDROMORPHONE HYDROCHLORIDE 4 MG: 4 TABLET ORAL at 18:33

## 2023-08-26 RX ADMIN — ACETAMINOPHEN 975 MG: 325 TABLET ORAL at 21:00

## 2023-08-26 RX ADMIN — ROSUVASTATIN CALCIUM 20 MG: 10 TABLET, FILM COATED ORAL at 21:00

## 2023-08-26 RX ADMIN — ACETAMINOPHEN 975 MG: 325 TABLET ORAL at 14:11

## 2023-08-26 RX ADMIN — LIDOCAINE: 50 OINTMENT TOPICAL at 12:33

## 2023-08-26 RX ADMIN — METHOCARBAMOL 500 MG: 500 TABLET, FILM COATED ORAL at 11:23

## 2023-08-26 RX ADMIN — GABAPENTIN 300 MG: 300 CAPSULE ORAL at 14:19

## 2023-08-26 RX ADMIN — LIDOCAINE: 50 OINTMENT TOPICAL at 21:11

## 2023-08-26 RX ADMIN — LIDOCAINE: 50 OINTMENT TOPICAL at 08:20

## 2023-08-26 RX ADMIN — HYDROMORPHONE HYDROCHLORIDE 4 MG: 4 TABLET ORAL at 21:35

## 2023-08-26 RX ADMIN — METHOCARBAMOL 500 MG: 500 TABLET, FILM COATED ORAL at 16:22

## 2023-08-26 RX ADMIN — METHYLPREDNISOLONE 8 MG: 4 TABLET ORAL at 21:35

## 2023-08-26 RX ADMIN — METHYLPREDNISOLONE 4 MG: 4 TABLET ORAL at 12:33

## 2023-08-26 RX ADMIN — ACETAMINOPHEN 975 MG: 325 TABLET ORAL at 08:20

## 2023-08-26 RX ADMIN — CEFTRIAXONE SODIUM 1 G: 1 INJECTION, POWDER, FOR SOLUTION INTRAMUSCULAR; INTRAVENOUS at 14:12

## 2023-08-26 RX ADMIN — METHYLPREDNISOLONE 4 MG: 4 TABLET ORAL at 06:55

## 2023-08-26 RX ADMIN — HYDROMORPHONE HYDROCHLORIDE 4 MG: 4 TABLET ORAL at 06:57

## 2023-08-26 RX ADMIN — METHOCARBAMOL 500 MG: 500 TABLET, FILM COATED ORAL at 08:20

## 2023-08-26 RX ADMIN — GABAPENTIN 100 MG: 100 CAPSULE ORAL at 08:20

## 2023-08-26 RX ADMIN — HYDROMORPHONE HYDROCHLORIDE 4 MG: 4 TABLET ORAL at 11:23

## 2023-08-26 RX ADMIN — POLYETHYLENE GLYCOL 3350 17 G: 17 POWDER, FOR SOLUTION ORAL at 14:19

## 2023-08-26 RX ADMIN — METHOCARBAMOL 500 MG: 500 TABLET, FILM COATED ORAL at 21:00

## 2023-08-26 RX ADMIN — UMECLIDINIUM BROMIDE AND VILANTEROL TRIFENATATE 1 PUFF: 62.5; 25 POWDER RESPIRATORY (INHALATION) at 08:20

## 2023-08-26 RX ADMIN — GABAPENTIN 300 MG: 300 CAPSULE ORAL at 21:00

## 2023-08-26 RX ADMIN — METHYLPREDNISOLONE 4 MG: 4 TABLET ORAL at 16:24

## 2023-08-26 RX ADMIN — HYDROMORPHONE HYDROCHLORIDE 4 MG: 4 TABLET ORAL at 14:19

## 2023-08-26 ASSESSMENT — ACTIVITIES OF DAILY LIVING (ADL)
ADLS_ACUITY_SCORE: 37

## 2023-08-26 NOTE — UTILIZATION REVIEW
Inpatient appropriate    Admission Status; Secondary Review Determination       Under the authority of the Utilization Management Committee, the utilization review process indicated a secondary review on the above patient. The review outcome is based on review of the medical records, discussions with staff, and applying clinical experience noted on the date of the review.     (x) Inpatient Status Appropriate - This patient's medical care is consistent with medical management for inpatient care and reasonable inpatient medical practice.     RATIONALE FOR DETERMINATION   68-year-old woman with history of hyperlipidemia, RASHEED, bronchiectasis, obesity, multilevel lumbar DJD and grade 1 anterolisthesis of L4 on L5 admitted on 8/24/2023 with acute on chronic low back pain with right lower extremity radiculopathy.  Patient recently underwent L3-4 epidural steroid injection.  Continues to have significant pain. Today gabapentin will be increased.  Also concerned about a UTI therefore IV antibiotics started.    At the time of admission with the information available to the attending physician more than 2 nights Hospital complex care was anticipated, based on patient risk of adverse outcome if treated as outpatient and complex care required. Inpatient admission is appropriate based on the Medicare guidelines.     The information on this document is developed by the utilization review team in order for the business office to ensure compliance. This only denotes the appropriateness of proper admission status and does not reflect the quality of care rendered.   The definitions of Inpatient Status and Observation Status used in making the determination above are those provided in the CMS Coverage Manual, Chapter 1 and Chapter 6, section 70.4.   Sincerely,   Familia Mahmood MD  Utilization Review  Physician Advisor  [facility].

## 2023-08-26 NOTE — PROGRESS NOTES
[facility]  OUTPATIENT PHYSICAL THERAPY EVALUATION  PLAN OF TREATMENT FOR OUTPATIENT REHABILITATION  (COMPLETE FOR INITIAL CLAIMS ONLY)  Patient's Last Name, First Name, M.I.  YOB: 1955  Andreea Hurd                        Provider's Name  [facility] Medical Record No.  [id number]                             Onset Date:  08/24/23   Start of Care Date:  08/26/23   Type:     _X_PT   ___OT   ___SLP Medical Diagnosis:  Acute right-sided low back pain, unspecified whether sciatica present              PT Diagnosis:  Impaired functional mobility Visits from SOC:  1     See note for plan of treatment, functional goals and certification details    I CERTIFY THE NEED FOR THESE SERVICES FURNISHED UNDER        THIS PLAN OF TREATMENT AND WHILE UNDER MY CARE     (Physician co-signature of this document indicates review and certification of the therapy plan).                08/26/23 1100   Appointment Info   Signing Clinician's Name / Credentials (PT) Brittanie Morrison, PT, DPT   Quick Adds   Quick Adds Certification   Living Environment   People in Home spouse   Current Living Arrangements house   Home Accessibility stairs to enter home;stairs within home   Number of Stairs, Main Entrance 3   Stair Railings, Main Entrance railings safe and in good condition;other (see comments)  (Railing on one side)   Number of Stairs, Within Home, Primary greater than 10 stairs   Stair Railings, Within Home, Primary railings safe and in good condition   Transportation Anticipated car, drives self   Living Environment Comments Patient could stay on main level if necessary.   Self-Care   Usual Activity Tolerance good   Current Activity Tolerance fair   Equipment Currently Used at Home none   Fall history within last six months no   Activity/Exercise/Self-Care Comment Patient is independent with mobility at baseline. Patient intermittently uses a SPC for LBP.    General Information   Onset of Illness/Injury or Date of Surgery 08/24/23   Referring Physician Tanner Pederson, DO   Patient/Family Therapy Goals Statement (PT) Manage LBP   Pertinent History of Current Problem (include personal factors and/or comorbidities that impact the POC) Acute right-sided low back pain, unspecified whether sciatica present   Posture    Posture Comments Forward flexed posture due to pain.   Range of Motion (ROM)   Range of Motion ROM is WFL   Strength (Manual Muscle Testing)   Strength (Manual Muscle Testing) strength is WFL   Bed Mobility   Bed Mobility supine-sit;sit-supine   Supine-Sit Giles (Bed Mobility) supervision   Sit-Supine Giles (Bed Mobility) supervision   Impairments Contributing to Impaired Bed Mobility pain   Transfers   Transfers sit-stand transfer   Impairments Contributing to Impaired Transfers pain   Sit-Stand Transfer   Sit-Stand Giles (Transfers) supervision   Assistive Device (Sit-Stand Transfers) walker, front-wheeled   Gait/Stairs (Locomotion)   Giles Level (Gait) supervision   Assistive Device (Gait) walker, front-wheeled   Distance in Feet 10'   Pattern (Gait) step-to   Deviations/Abnormal Patterns (Gait) antalgic;ct decreased   Clinical Impression   Criteria for Skilled Therapeutic Intervention Yes, treatment indicated   PT Diagnosis (PT) Impaired functional mobility   Influenced by the following impairments Acute pain   Functional limitations due to impairments Transfers, gait, stairs   Clinical Presentation (PT Evaluation Complexity) Stable/Uncomplicated   Clinical Presentation Rationale Patient presents as medically diagnosed.   Clinical Decision Making (Complexity) low complexity   Planned Therapy Interventions (PT) gait training;home exercise program;patient/family education;stair training;strengthening;stretching;transfer training;home program guidelines   Anticipated Equipment Needs at Discharge (PT) walker,  rolling  (FWW)   Risk & Benefits of therapy have been explained evaluation/treatment results reviewed;care plan/treatment goals reviewed;patient   PT Total Evaluation Time   PT Eval, Low Complexity Minutes (00343) 15   Therapy Certification   Start of care date 08/26/23   Certification date from 08/26/23   Certification date to 09/02/23   Medical Diagnosis Acute right-sided low back pain, unspecified whether sciatica present   Physical Therapy Goals   PT Frequency Daily   PT Predicted Duration/Target Date for Goal Attainment 09/02/23   PT Goals Transfers;Gait;Stairs   PT: Transfers Modified independent;Sit to/from stand;Assistive device  (LRAD)   PT: Gait Modified independent;Assistive device;150 feet  (LRAD)   PT: Stairs Supervision/stand-by assist;3 stairs;Rail on left;Rail on right   Interventions   Interventions Quick Adds Therapeutic Activity   Therapeutic Activity   Therapeutic Activities: dynamic activities to improve functional performance Minutes (43938) 10   Symptoms Noted During/After Treatment Increased pain   Treatment Detail/Skilled Intervention Once standing, patient used the bathroom IND. Patient then ambulated 75' in hallways with supervision and FWW. Forward flexed posture per patient preference. Verbal cues for 3-point gait pattern with FWW. Seated forward flexion x 10 reps with static hold at end-range with some temporary relief of symptoms per patient.   PT Discharge Planning   PT Plan Transfers, gait with FWW, stairs, HEP for LBP   PT Discharge Recommendation (DC Rec) (S)  home with assist;home with outpatient physical therapy   PT Rationale for DC Rec Patient is able to mobilize with SBA using a FWW. Patient reports she can borrow a FWW to use at home. Patient would benefit from OP PT to manage LBP.   PT Brief overview of current status Supervision for all mobility. Limited by acute pain.   Total Session Time   Timed Code Treatment Minutes 10   Total Session Time (sum of timed and untimed  services) 25

## 2023-08-26 NOTE — PLAN OF CARE
PRIMARY DIAGNOSIS: ACUTE PAIN  OUTPATIENT/OBSERVATION GOALS TO BE MET BEFORE DISCHARGE:  1. Pain Status: Improved-controlled with oral pain medications.    2. Return to near baseline physical activity: No    3. Cleared for discharge by consultants (if involved): No    Discharge Planner Nurse   Safe discharge environment identified: No  Barriers to discharge: Yes       Entered by: Dayne Moe RN 08/25/2023 8:30 PM     Please review provider order for any additional goals.   Nurse to notify provider when observation goals have been met and patient is ready for discharge.Goal Outcome Evaluation:       Prn oral dilaudid 4mg given for pain Rt lower back. Will continue to monitor.

## 2023-08-26 NOTE — PLAN OF CARE
PRIMARY DIAGNOSIS: ACUTE PAIN  OUTPATIENT/OBSERVATION GOALS TO BE MET BEFORE DISCHARGE:  1. Pain Status: Improved-controlled with oral pain medications.    2. Return to near baseline physical activity: No    3. Cleared for discharge by consultants (if involved): No    Discharge Planner Nurse   Safe discharge environment identified: No  Barriers to discharge: Yes       Entered by: Dayne Moe RN 08/26/2023 5:10 AM     Please review provider order for any additional goals.   Nurse to notify provider when observation goals have been met and patient is ready for discharge.Goal Outcome Evaluation:

## 2023-08-26 NOTE — PLAN OF CARE
PRIMARY DIAGNOSIS: ACUTE PAIN  OUTPATIENT/OBSERVATION GOALS TO BE MET BEFORE DISCHARGE:  1. Pain Status: Improved-controlled with oral pain medications.    2. Return to near baseline physical activity: No    3. Cleared for discharge by consultants (if involved): No    Discharge Planner Nurse   Safe discharge environment identified: No  Barriers to discharge: Yes       Entered by: Yared Way RN 08/26/2023 2:44 PM     Please review provider order for any additional goals.   Nurse to notify provider when observation goals have been met and patient is ready for discharge.Goal Outcome Evaluation:

## 2023-08-26 NOTE — PROGRESS NOTES
"[facility]    Medicine Progress Note - Hospitalist Service    Date of Admission:  8/24/2023    Assessment & Plan   Andreea Hurd is a 68-year-old woman with history of hyperlipidemia, RASHEED, bronchiectasis, obesity, multilevel lumbar DJD and grade 1 anterolisthesis of L4 on L5 admitted on 8/24/2023 with acute on chronic low back pain with right lower extremity radiculopathy.      She received L3-4 epidural steroid injection on 8/17/2023.  Lumbar CT revealed lumbar DJD with grade 1 anterolisthesis of L4 on L5.    Acute on chronic low back pain with right lower extremity radiculopathy  Multilevel lumbar DJD with grade 1 anterolisthesis of L4 on L5.  No indication for surgical intervention at this time as per neurosurgery service.  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  -Outpatient CT myelogram per neurosurgery service  -Neurosurgery [id number] assistance      Suspected UTI  Frequent urination  Urinalysis suggestive of UTI  -Follow-up urine culture  -Start IV ceftriaxone    ? Right renal lesion  -Renal ultrasound shows simple cyst       Diet: Regular Diet Adult    DVT Prophylaxis: Pneumatic Compression Devices  Scott Catheter: Not present  Lines: None     Cardiac Monitoring: None  Code Status: Full Code      Clinically Significant Risk Factors Present on Admission                       # Obesity: Estimated body mass index is 34.54 kg/m  as calculated from the following:    Height as of this encounter: 1.6 m (5' 3\").    Weight as of this encounter: 88.5 kg (195 lb).                Disposition Plan     Expected Discharge Date: 08/26/2023    Discharge Delays: OT New Consult needed  PT New Consult needed  Specialist Consult (enter specialist & decision " needed in comments)  Destination: home with family            Mateuszdelvisopal Cheatham MD  Hospitalist Service  [facility]  Securely message with [employer] (more info)  Text page via [employer] Paging/Directory   ______________________________________________________________________    Interval History   She complains of back pain exacerbated by movement.  She has been ambulating from her room to the bathroom.  She endorsed urinary frequency.  Friend is visiting.      Physical Exam   Vital Signs: Temp: 98.6  F (37  C) Temp src: Oral BP: 121/58 Pulse: 80   Resp: 18 SpO2: 92 % O2 Device: None (Room air) Oxygen Delivery: 2 LPM  Weight: 195 lbs 0 oz    General appearance: Obese, awake, Alert, Cooperative, not in any obvious distress and appears stated age   HEENT: Normocephalic, atraumatic, conjunctiva clear without icterus and ears without discharge  Lungs: Clear to auscultation bilaterally, no wheezing, good air exchange, normal work of breathing  Cardiovascular: Regular Rate and Rythm, normal apical impulse, normal S1 and S2, no lower extremity edema bilaterally  Abdomen: Soft, non-tender and Non-distended, active bowel sounds  Skin: Skin color, texture normal and bruising or bleeding. No rashes or lesions over face, neck, arms and legs, turgor normal.  Musculoskeletal: No bony deformities or joint tenderness. Normal ROM upon flexion & extension.   Neurologic: Alert & Oriented X 3, Facial symmetry preserved and upper & lower extremities moving well with symmetry  Psychiatric: Calm, normal eye contact and normal affect      Medical Decision Making       40 MINUTES SPENT BY ME on the date of service doing chart review, history, exam, documentation & further activities per the note.      Data

## 2023-08-26 NOTE — PLAN OF CARE
PRIMARY DIAGNOSIS: ACUTE PAIN  OUTPATIENT/OBSERVATION GOALS TO BE MET BEFORE DISCHARGE:  1. Pain Status: No improvement noted. Consider adjustment in pain regimen.    2. Return to near baseline physical activity: No    3. Cleared for discharge by consultants (if involved): No    Discharge Planner Nurse   Safe discharge environment identified: No  Barriers to discharge: Yes       Entered by: Yared Way RN 08/26/2023 8:55 AM     Please review provider order for any additional goals.   Nurse to notify provider when observation goals have been met and patient is ready for discharge.Goal Outcome Evaluation:

## 2023-08-26 NOTE — PROGRESS NOTES
Pt brought her home cpap in, but unable to bleed 02 into this type of cpap, no fittings small enough. Pt will use NC tonight.

## 2023-08-26 NOTE — PROGRESS NOTES
08/26/23 0930   Appointment Info   Signing Clinician's Name / Credentials (OT) Mya WATKINS fernando CurielFranklyn YENNIFER/L   Living Environment   People in Home spouse   Current Living Arrangements house   Home Accessibility stairs within home   Transportation Anticipated car, drives self   Living Environment Comments bed and shower upstairs   Self-Care   Current Activity Tolerance poor   Activity/Exercise/Self-Care Comment Pt is independent with ADLs at baseline.   General Information   Onset of Illness/Injury or Date of Surgery 08/24/23   Referring Physician Bg   General Observations and Info Pt limited by pain   Cognitive Status Examination   Cognitive Status Comments WFL   Visual Perception   Visual Impairment/Limitations corrective lenses full-time   Sensory   Sensory Quick Adds sensation intact   Range of Motion Comprehensive   General Range of Motion no range of motion deficits identified   Strength Comprehensive (MMT)   General Manual Muscle Testing (MMT) Assessment no strength deficits identified   Coordination   Upper Extremity Coordination No deficits were identified   Bed Mobility   Comment (Bed Mobility) SBA   Transfers   Transfer Comments SBA   Balance   Balance Comments SBA   Activities of Daily Living   BADL Assessment/Intervention   (SA)   Clinical Impression   Criteria for Skilled Therapeutic Interventions Met (OT) Yes, treatment indicated   OT Diagnosis Impaired ADL independence due to pain   OT Problem List-Impairments impacting ADL pain;mobility   Assessment of Occupational Performance 1-3 Performance Deficits   Planned Therapy Interventions (OT) ADL retraining;bed mobility training;transfer training   Clinical Decision Making Complexity (OT) low complexity   Risk & Benefits of therapy have been explained evaluation/treatment results reviewed;patient   Clinical Impression Comments Pt seen bedside for OT eval and treatment.  Pt demonstrates decreased independence with ADLS due to pain.  OT to continue  to address to teach proper techs with body [facility].  Recommend home with assist as needed.   OT Total Evaluation Time   OT Eval, Low Complexity Minutes (71177) 10   Therapy Certification   Start of Care Date 08/26/23   Certification date from 08/26/26   Certification date to 09/02/23   Medical Diagnosis back pain   OT Goals   Therapy Frequency (OT) Daily   OT Predicted Duration/Target Date for Goal Attainment 09/02/23   OT Goals Lower Body Dressing;Transfers   OT: Lower Body Dressing Supervision/stand-by assist;using adaptive equipment   OT: Transfer Modified independent   OT Discharge Planning   OT Plan dressing, transfers, [facility]   OT Discharge Recommendation (DC Rec) home with assist   OT Rationale for DC Rec Recommend home with assist as pt is SBA with AD, limited by pain.   OT Brief overview of current status SBA/Mod I                                                                                   [facility]      OUTPATIENT OCCUPATIONAL THERAPY  EVALUATION  PLAN OF TREATMENT FOR OUTPATIENT REHABILITATION  (COMPLETE FOR INITIAL CLAIMS ONLY)  Patient's Last Name, First Name, M.I.  YOB: 1955  Andreea Hurd                          Provider's Name  [facility] Medical Record No.  [id number]                               Onset Date:  08/24/23   Start of Care Date:  (P) 08/26/23     Type:     ___PT   _X_OT   ___SLP Medical Diagnosis:  (P) back pain                        OT Diagnosis:  (P) Impaired ADL independence due to pain   Visits from SOC:  1   _________________________________________________________________________________  Plan of Treatment/Functional Goals    Planned Interventions: (P) ADL retraining, bed mobility training, transfer training   Goals: See Occupational Therapy Goals on Care Plan in [facility] electronic health record.    Therapy Frequency: (P) Daily  Predicted Duration of Therapy Intervention: (P)  09/02/23  _________________________________________________________________________________    I CERTIFY THE NEED FOR THESE SERVICES FURNISHED UNDER        THIS PLAN OF TREATMENT AND WHILE UNDER MY CARE .             Physician Signature               Date    X_____________________________________________________                  Certification date from: (P) 08/26/26, Certification date to: (P) 09/02/23    Referring Physician: Pederson (P)            Initial Assessment        See Occupational Therapy evaluation dated (P) 08/26/23 in Epic electronic health record.

## 2023-08-27 PROCEDURE — 120N000001 HC R&B MED SURG/OB

## 2023-08-27 PROCEDURE — 250N000013 HC RX MED GY IP 250 OP 250 PS 637: Performed by: NURSE PRACTITIONER

## 2023-08-27 PROCEDURE — 99232 SBSQ HOSP IP/OBS MODERATE 35: CPT | Performed by: INTERNAL MEDICINE

## 2023-08-27 PROCEDURE — 99231 SBSQ HOSP IP/OBS SF/LOW 25: CPT | Performed by: PHYSICIAN ASSISTANT

## 2023-08-27 PROCEDURE — 250N000013 HC RX MED GY IP 250 OP 250 PS 637: Performed by: INTERNAL MEDICINE

## 2023-08-27 PROCEDURE — 250N000012 HC RX MED GY IP 250 OP 636 PS 637: Performed by: NURSE PRACTITIONER

## 2023-08-27 PROCEDURE — 999N000157 HC STATISTIC RCP TIME EA 10 MIN

## 2023-08-27 PROCEDURE — 250N000011 HC RX IP 250 OP 636: Mod: JZ | Performed by: INTERNAL MEDICINE

## 2023-08-27 RX ADMIN — ACETAMINOPHEN 975 MG: 325 TABLET ORAL at 14:28

## 2023-08-27 RX ADMIN — HYDROXYZINE HYDROCHLORIDE 50 MG: 25 TABLET, FILM COATED ORAL at 20:43

## 2023-08-27 RX ADMIN — ACETAMINOPHEN 975 MG: 325 TABLET ORAL at 20:43

## 2023-08-27 RX ADMIN — CEFTRIAXONE SODIUM 1 G: 1 INJECTION, POWDER, FOR SOLUTION INTRAMUSCULAR; INTRAVENOUS at 14:28

## 2023-08-27 RX ADMIN — METHOCARBAMOL 500 MG: 500 TABLET, FILM COATED ORAL at 16:47

## 2023-08-27 RX ADMIN — LIDOCAINE: 50 OINTMENT TOPICAL at 16:50

## 2023-08-27 RX ADMIN — HYDROMORPHONE HYDROCHLORIDE 4 MG: 4 TABLET ORAL at 11:34

## 2023-08-27 RX ADMIN — LIDOCAINE: 50 OINTMENT TOPICAL at 20:46

## 2023-08-27 RX ADMIN — METHYLPREDNISOLONE 4 MG: 4 TABLET ORAL at 20:44

## 2023-08-27 RX ADMIN — GABAPENTIN 300 MG: 300 CAPSULE ORAL at 20:42

## 2023-08-27 RX ADMIN — METHYLPREDNISOLONE 4 MG: 4 TABLET ORAL at 16:47

## 2023-08-27 RX ADMIN — UMECLIDINIUM BROMIDE AND VILANTEROL TRIFENATATE 1 PUFF: 62.5; 25 POWDER RESPIRATORY (INHALATION) at 08:20

## 2023-08-27 RX ADMIN — METHYLPREDNISOLONE 4 MG: 4 TABLET ORAL at 11:34

## 2023-08-27 RX ADMIN — METHOCARBAMOL 500 MG: 500 TABLET, FILM COATED ORAL at 08:21

## 2023-08-27 RX ADMIN — METHOCARBAMOL 500 MG: 500 TABLET, FILM COATED ORAL at 20:43

## 2023-08-27 RX ADMIN — GABAPENTIN 300 MG: 300 CAPSULE ORAL at 14:28

## 2023-08-27 RX ADMIN — ROSUVASTATIN CALCIUM 20 MG: 10 TABLET, FILM COATED ORAL at 20:43

## 2023-08-27 RX ADMIN — HYDROMORPHONE HYDROCHLORIDE 4 MG: 4 TABLET ORAL at 14:28

## 2023-08-27 RX ADMIN — HYDROMORPHONE HYDROCHLORIDE 4 MG: 4 TABLET ORAL at 05:58

## 2023-08-27 RX ADMIN — POLYETHYLENE GLYCOL 3350 17 G: 17 POWDER, FOR SOLUTION ORAL at 08:20

## 2023-08-27 RX ADMIN — HYDROMORPHONE HYDROCHLORIDE 4 MG: 4 TABLET ORAL at 17:30

## 2023-08-27 RX ADMIN — HYDROMORPHONE HYDROCHLORIDE 4 MG: 4 TABLET ORAL at 00:43

## 2023-08-27 RX ADMIN — LIDOCAINE: 50 OINTMENT TOPICAL at 08:21

## 2023-08-27 RX ADMIN — GABAPENTIN 300 MG: 300 CAPSULE ORAL at 08:21

## 2023-08-27 RX ADMIN — METHOCARBAMOL 500 MG: 500 TABLET, FILM COATED ORAL at 11:34

## 2023-08-27 RX ADMIN — HYDROMORPHONE HYDROCHLORIDE 4 MG: 4 TABLET ORAL at 08:48

## 2023-08-27 RX ADMIN — HYDROMORPHONE HYDROCHLORIDE 4 MG: 4 TABLET ORAL at 20:43

## 2023-08-27 RX ADMIN — METHYLPREDNISOLONE 4 MG: 4 TABLET ORAL at 07:06

## 2023-08-27 RX ADMIN — ACETAMINOPHEN 975 MG: 325 TABLET ORAL at 08:20

## 2023-08-27 ASSESSMENT — ACTIVITIES OF DAILY LIVING (ADL)
ADLS_ACUITY_SCORE: 37

## 2023-08-27 NOTE — PROGRESS NOTES
[facility]    Medicine Progress Note - Hospitalist Service    Date of Admission:  8/24/2023    Assessment & Plan   Andreea Hurd is a 68-year-old woman with history of hyperlipidemia, RASHEED, bronchiectasis, obesity, multilevel lumbar DJD and grade 1 anterolisthesis of L4 on L5 admitted on 8/24/2023 with acute on chronic low back pain with right lower extremity radiculopathy.      She received L3-4 epidural steroid injection on 8/17/2023.  Lumbar CT revealed lumbar DJD with grade 1 anterolisthesis of L4 on L5.  She received IV methylprednisolone without improvement during hospital stay.  Neurosurgery service recommended epidural steroid injection for L4-L5.      Acute on chronic low back pain with right lower extremity radiculopathy  Multilevel lumbar DJD with grade 1 anterolisthesis of L4 on L5.  No indication for surgical intervention at this time as per neurosurgery service.  -Unable to do MRI due to false eye and apparent metal  -Continue methylprednisolone as scheduled by neurosurgery service  -Continue Vistaril  --Continue as needed Dilaudid  -Gabapentin to 300 mg 3 times daily  -Epidural steroid injection for L4-L5 per neurosurgery service-discussed with neurosurgery PA.  -Consider L4-L5 injection if there is no improvement.  -Outpatient CT myelogram per neurosurgery service  -Neurosurgery [id number] assistance      Suspected UTI  Frequent urination  Urinalysis suggestive of UTI  -Follow-up urine culture  -Start IV ceftriaxone    ? Right renal lesion  -Renal ultrasound shows simple cyst       Diet: Regular Diet Adult    DVT Prophylaxis: Pneumatic Compression Devices  Scott Catheter: Not present  Lines: None     Cardiac Monitoring: None  Code Status: Full Code      Clinically Significant Risk Factors Present on Admission                       # Obesity: Estimated body mass index is 34.54 kg/m  as calculated from the following:    Height as of this encounter: 1.6 m (5'  "3\").    Weight as of this encounter: 88.5 kg (195 lb).                Disposition Plan     Expected Discharge Date: 08/28/2023    Discharge Delays: Specialist Consult (enter specialist & decision needed in comments)  Destination: home with family            Tom Cheatham MD  Hospitalist Service  [facility]  Securely message with [employer] (more info)  Text page via [employer] Paging/Directory   ______________________________________________________________________    Interval History   She complains of severe back pain at rest and with activity.   is at bedside.  No significant improvement with IV methylprednisone.  Neurosurgery service recommended epidural steroid injection for L4-L5.        Physical Exam   Vital Signs: Temp: 97.9  F (36.6  C) Temp src: Oral BP: 115/57 Pulse: 56   Resp: 18 SpO2: 92 % O2 Device: None (Room air)    Weight: 195 lbs 0 oz    General appearance: Obese, awake, Alert, Cooperative, not in any obvious distress and appears stated age   HEENT: Normocephalic, atraumatic, conjunctiva clear without icterus and ears without discharge  Lungs: Clear to auscultation bilaterally, no wheezing, good air exchange, normal work of breathing  Cardiovascular: Regular Rate and Rythm, normal apical impulse, normal S1 and S2, no lower extremity edema bilaterally  Abdomen: Soft, non-tender and Non-distended, active bowel sounds  Skin: Skin color, texture normal and bruising or bleeding. No rashes or lesions over face, neck, arms and legs, turgor normal.  Musculoskeletal: No bony deformities or joint tenderness. Normal ROM upon flexion & extension.   Neurologic: Alert & Oriented X 3, Facial symmetry preserved and upper & lower extremities moving well with symmetry  Psychiatric: Calm, normal eye contact and normal affect      Medical Decision Making       40 MINUTES SPENT BY ME on the date of service doing chart review, history, exam, documentation & further activities per the note.  "     Data

## 2023-08-27 NOTE — PLAN OF CARE
Problem: Plan of Care - These are the overarching goals to be used throughout the patient stay.    Goal: Plan of Care Review  Description: The Plan of Care Review/Shift note should be completed every shift.  The Outcome Evaluation is a brief statement about your assessment that the patient is improving, declining, or no change.  This information will be displayed automatically on your shift note.  Outcome: Progressing     Problem: Pain Acute  Goal: Optimal Pain Control and Function  Outcome: Progressing     Problem: Mobility Impairment  Goal: Optimal Mobility  Outcome: Progressing   Goal Outcome Evaluation:       Pt a&o, able to make needs known. Prn oral dilaudid 4mg given for pain with improvement. Ambulated to bathroom, standby assist using walker. Utilizing home CPAP with oxygen on at 2LPM. Vital Signs stable. Will continue to monitor.

## 2023-08-27 NOTE — PROGRESS NOTES
Neurosurgery progress note    Ms. Hurd is a 68-year-old female, who has been having pain in her right L3 distribution, on 8/17/2023 she underwent a right L3 transforaminal epidural steroid injection.  She states after the injection she was able to walk much better for a day or 2, then the injection wore off, and her pain returned.    She continues to report pain in her right low back radiating around her hip and into her medial thigh.  This seems to conform well with the L3 distribution.  She has not noted improvement with oral steroid medication recently.    She is seen ambulating in the hallway with a walker.    On exam her strength is 5 out of 5 in bilateral lower extremities, negative straight leg raise bilaterally, low back is moderately tender on the right and in the midline in the lower lumbar region.    Imaging    Her lumbar CT scan demonstrates foraminal stenosis most severe on the left at L3-4, and fairly mild on the right at L3-4.  There is also anterolisthesis at L4-5, moderate to severe stenosis at L2-3 and L3-4, and moderate bilateral neuroforaminal stenosis at L4-5.    Plan    The patient is interested in trying another injection, however it seems that her initial injection gave her good anesthetic response to her pain while targeting the right L3 nerve, which states with the distribution of her pain.  This could indicate that her main sources of pain are possibly the spinal stenosis at L3-4, alternatively an injection at L4-5 could be attempted to see if she has a better response there even though this is less in the distribution of her symptoms.    The patient is unable to obtain a lumbar MRI due to ocular implant.  Therefore if any surgical planning were to be undertaken we would recommend a CT lumbar myelogram.    An order will be placed for an L4- 5 injection as this is something the patient would like to try at least before moving onto further surgical discussions.

## 2023-08-27 NOTE — PLAN OF CARE
Problem: Plan of Care - These are the overarching goals to be used throughout the patient stay.    Goal: Optimal Comfort and Wellbeing  Intervention: Monitor Pain and Promote Comfort  Recent Flowsheet Documentation  Taken 8/26/2023 2100 by Jo Ann Gordillo RN  Pain Management Interventions: medication (see MAR)     Problem: Mobility Impairment  Goal: Optimal Mobility  Outcome: Progressing  Intervention: Optimize Mobility  Recent Flowsheet Documentation  Taken 8/26/2023 2100 by Jo Ann Gordillo RN  Activity Management:   activity adjusted per tolerance   up in chair  Taken 8/26/2023 1855 by Jo Ann Gordillo RN  Activity Management:   activity adjusted per tolerance   up in chair   Goal Outcome Evaluation:             Pt is alert and oriented times 4. Pt has intractable lower back pain and has been on PRN Dilaudid tablet 4 mg Q3 and scheduled acetaminophen (TYLENOL) tablet 975 mg  three times daily. Medications have been effective per patient statement. She is able to ambulate with walker and is standby.

## 2023-08-27 NOTE — PLAN OF CARE
Problem: Plan of Care - These are the overarching goals to be used throughout the patient stay.    Goal: Plan of Care Review  Description: The Plan of Care Review/Shift note should be completed every shift.  The Outcome Evaluation is a brief statement about your assessment that the patient is improving, declining, or no change.  This information will be displayed automatically on your shift note.  Outcome: Progressing   Goal Outcome Evaluation:       Pt has been walking halls with walker. Pain being managed with oral dilaudid and muscle relaxers. Pt to have MARIANNE tomorrow.

## 2023-08-28 ENCOUNTER — APPOINTMENT (OUTPATIENT)
Dept: INTERVENTIONAL RADIOLOGY/VASCULAR | Facility: HOSPITAL | Age: 68
DRG: 552 | End: 2023-08-28
Attending: PHYSICIAN ASSISTANT
Payer: MEDICARE

## 2023-08-28 ENCOUNTER — APPOINTMENT (OUTPATIENT)
Dept: OCCUPATIONAL THERAPY | Facility: HOSPITAL | Age: 68
DRG: 552 | End: 2023-08-28
Payer: MEDICARE

## 2023-08-28 ENCOUNTER — APPOINTMENT (OUTPATIENT)
Dept: PHYSICAL THERAPY | Facility: HOSPITAL | Age: 68
DRG: 552 | End: 2023-08-28
Payer: MEDICARE

## 2023-08-28 VITALS
SYSTOLIC BLOOD PRESSURE: 127 MMHG | HEIGHT: 63 IN | BODY MASS INDEX: 36.09 KG/M2 | TEMPERATURE: 97.7 F | OXYGEN SATURATION: 96 % | DIASTOLIC BLOOD PRESSURE: 71 MMHG | HEART RATE: 69 BPM | WEIGHT: 203.71 LBS | RESPIRATION RATE: 18 BRPM

## 2023-08-28 PROCEDURE — 250N000011 HC RX IP 250 OP 636: Mod: JZ | Performed by: RADIOLOGY

## 2023-08-28 PROCEDURE — 97535 SELF CARE MNGMENT TRAINING: CPT | Mod: GO

## 2023-08-28 PROCEDURE — 250N000013 HC RX MED GY IP 250 OP 250 PS 637: Performed by: INTERNAL MEDICINE

## 2023-08-28 PROCEDURE — 3E0R3NZ INTRODUCTION OF ANALGESICS, HYPNOTICS, SEDATIVES INTO SPINAL CANAL, PERCUTANEOUS APPROACH: ICD-10-PCS | Performed by: PHYSICIAN ASSISTANT

## 2023-08-28 PROCEDURE — 62323 NJX INTERLAMINAR LMBR/SAC: CPT

## 2023-08-28 PROCEDURE — 97530 THERAPEUTIC ACTIVITIES: CPT | Mod: GP

## 2023-08-28 PROCEDURE — 250N000013 HC RX MED GY IP 250 OP 250 PS 637: Performed by: NURSE PRACTITIONER

## 2023-08-28 PROCEDURE — 3E0R33Z INTRODUCTION OF ANTI-INFLAMMATORY INTO SPINAL CANAL, PERCUTANEOUS APPROACH: ICD-10-PCS | Performed by: PHYSICIAN ASSISTANT

## 2023-08-28 PROCEDURE — 250N000012 HC RX MED GY IP 250 OP 636 PS 637: Performed by: NURSE PRACTITIONER

## 2023-08-28 PROCEDURE — 99239 HOSP IP/OBS DSCHRG MGMT >30: CPT | Performed by: INTERNAL MEDICINE

## 2023-08-28 RX ORDER — METHOCARBAMOL 500 MG/1
500 TABLET, FILM COATED ORAL 4 TIMES DAILY PRN
Qty: 40 TABLET | Refills: 0 | Status: SHIPPED | OUTPATIENT
Start: 2023-08-28

## 2023-08-28 RX ORDER — IOPAMIDOL 612 MG/ML
15 INJECTION, SOLUTION INTRATHECAL ONCE
Status: COMPLETED | OUTPATIENT
Start: 2023-08-28 | End: 2023-08-28

## 2023-08-28 RX ORDER — ACETAMINOPHEN 325 MG/1
650 TABLET ORAL EVERY 4 HOURS PRN
Status: DISCONTINUED | OUTPATIENT
Start: 2023-08-28 | End: 2023-08-28 | Stop reason: HOSPADM

## 2023-08-28 RX ORDER — POLYETHYLENE GLYCOL 3350 17 G/17G
17 POWDER, FOR SOLUTION ORAL DAILY PRN
Qty: 10 PACKET | Refills: 0 | Status: SHIPPED | OUTPATIENT
Start: 2023-08-28

## 2023-08-28 RX ORDER — OXYCODONE HYDROCHLORIDE 5 MG/1
5 TABLET ORAL EVERY 6 HOURS PRN
Qty: 12 TABLET | Refills: 0 | Status: SHIPPED | OUTPATIENT
Start: 2023-08-28 | End: 2023-08-31

## 2023-08-28 RX ADMIN — METHOCARBAMOL 500 MG: 500 TABLET, FILM COATED ORAL at 11:01

## 2023-08-28 RX ADMIN — METHOCARBAMOL 500 MG: 500 TABLET, FILM COATED ORAL at 07:33

## 2023-08-28 RX ADMIN — HYDROMORPHONE HYDROCHLORIDE 4 MG: 4 TABLET ORAL at 08:26

## 2023-08-28 RX ADMIN — ACETAMINOPHEN 975 MG: 325 TABLET ORAL at 07:33

## 2023-08-28 RX ADMIN — HYDROMORPHONE HYDROCHLORIDE 4 MG: 4 TABLET ORAL at 15:03

## 2023-08-28 RX ADMIN — GABAPENTIN 300 MG: 300 CAPSULE ORAL at 07:34

## 2023-08-28 RX ADMIN — LIDOCAINE: 50 OINTMENT TOPICAL at 08:26

## 2023-08-28 RX ADMIN — HYDROMORPHONE HYDROCHLORIDE 4 MG: 4 TABLET ORAL at 11:01

## 2023-08-28 RX ADMIN — METHOCARBAMOL 500 MG: 500 TABLET, FILM COATED ORAL at 15:03

## 2023-08-28 RX ADMIN — IOPAMIDOL 1 ML: 612 INJECTION, SOLUTION INTRATHECAL at 10:09

## 2023-08-28 RX ADMIN — HYDROXYZINE HYDROCHLORIDE 50 MG: 25 TABLET, FILM COATED ORAL at 07:40

## 2023-08-28 RX ADMIN — METHYLPREDNISOLONE 4 MG: 4 TABLET ORAL at 06:49

## 2023-08-28 RX ADMIN — GABAPENTIN 300 MG: 300 CAPSULE ORAL at 13:24

## 2023-08-28 RX ADMIN — LIDOCAINE: 50 OINTMENT TOPICAL at 13:24

## 2023-08-28 RX ADMIN — HYDROMORPHONE HYDROCHLORIDE 4 MG: 4 TABLET ORAL at 05:34

## 2023-08-28 RX ADMIN — ACETAMINOPHEN 975 MG: 325 TABLET ORAL at 13:24

## 2023-08-28 RX ADMIN — UMECLIDINIUM BROMIDE AND VILANTEROL TRIFENATATE 1 PUFF: 62.5; 25 POWDER RESPIRATORY (INHALATION) at 08:26

## 2023-08-28 RX ADMIN — POLYETHYLENE GLYCOL 3350 17 G: 17 POWDER, FOR SOLUTION ORAL at 11:01

## 2023-08-28 ASSESSMENT — ACTIVITIES OF DAILY LIVING (ADL)
ADLS_ACUITY_SCORE: 37
ADLS_ACUITY_SCORE: 36
ADLS_ACUITY_SCORE: 37

## 2023-08-28 NOTE — PLAN OF CARE
Problem: Plan of Care - These are the overarching goals to be used throughout the patient stay.    Goal: Plan of Care Review  Description: The Plan of Care Review/Shift note should be completed every shift.  The Outcome Evaluation is a brief statement about your assessment that the patient is improving, declining, or no change.  This information will be displayed automatically on your shift note.  Outcome: Progressing     Problem: Pain Acute  Goal: Optimal Pain Control and Function  Outcome: Progressing     Problem: Mobility Impairment  Goal: Optimal Mobility  Outcome: Progressing   Goal Outcome Evaluation:       Pt a&o, able to make needs known. Prn oral dilaudid 4mg, atarax 50mg and ice pack given for pain with improvement. Utilizing home CPAP with oxygen at 2LPM. Slept well overnight. VSS. Will continue to monitor.

## 2023-08-28 NOTE — PLAN OF CARE
Back from injection. Pain 4/10  -  given PO medications.  VSS.  Interventional radiology stopped in and spoke with Marialuisa and her .  Her leg numbness will subside in a few hours. Marialuisa hopes to be discharged by 4.   Also using ice to site and topical lido for pain management. Murelax given. (Prevent constipation while on narcotic pain medications) falls prevention plan in place. Ellie Penn RN    Problem: Pain Acute  Goal: Optimal Pain Control and Function  Outcome: Progressing     Problem: Pain Chronic (Persistent) (Comorbidity Management)  Goal: Acceptable Pain Control and Functional Ability  Outcome: Progressing     Problem: Mobility Impairment  Goal: Optimal Mobility  Outcome: Progressing

## 2023-08-28 NOTE — PROGRESS NOTES
Patient seen post L3-4 interlaminar injection. Patient feels good. She would like to discharge. Defer discharge to medicine. She has follow up appointment with Dr Tesfaye tomorrow. Recommend physical therapy. Referral given to patient. Will defer to patient to call our team in the future if she would like to discuss surgical options. At this time she is not wanting any spine surgery.     TANYA Serrano-CNP  [facility]  O: [phone]

## 2023-08-28 NOTE — PROCEDURES
Neurointerventional Surgery  Post-procedure note    Patient Name: Andreea Hurd  MRN: [id number]  Date of Procedure: August 28, 2023    Procedure: Interlaminar L3-4 epidural steroid injection  Radiologist: AI MARADIAGA MD; MILENA MALCOLM MD    Contrast: 1 ml Isovue 300M  Fluoro Time: 4.7 minutes  Air Kerma: 550 mGy    EBL: Minimal  Complications: None apparent    Patient reevaluated immediately prior to sedation and prior to procedure.    Preliminary Report:   (See dictation for full detail)  Successful interlaminar epidural infusion of 10 mg dexamethasone and 6 mL of Lidocaine 1% at the L3-4 level. Initial attempts at accessing the L4-5 epidural space were unsuccessful. Immediately following the procedure the patient reports that her pain went from 10/10 to 5/10.    Assess/Plan:  Bed rest x 1 hour   Report to ordering    AI MARADIAGA MD MD  [phone]

## 2023-08-28 NOTE — DISCHARGE INSTRUCTIONS
Neurosurgery [phone] [id number]  [facility]

## 2023-08-28 NOTE — PLAN OF CARE
Discharged to home after receiving her 1600 robaxin and PO Dilaudid 4mg as PRN.   here for teaching, actually has an appointment tomorrow that she will keep. States numbness in right leg as resolved. Pain improved. Ate a good lunch. Will be picking up medications at her pharmacy. All belongings returned at discharge including her MDI from home and her cell phone and . Purse returned previously to . All teaching completed, no questions. Ellie Penn RN    Problem: Plan of Care - These are the overarching goals to be used throughout the patient stay.    Goal: Readiness for Transition of Care  8/28/2023 1558 by Ellie Penn, RN  Outcome: Adequate for Care Transition

## 2023-08-28 NOTE — PROGRESS NOTES
Physical Therapy Discharge Summary    Reason for therapy discharge:    Discharged to home with outpatient therapy.    Progress towards therapy goal(s). See goals on Care Plan in [facility] electronic health record for goal details.  Goals met    Therapy recommendation(s):    Continued therapy is recommended.  Rationale/Recommendations:  OP PT to manage low back pain.

## 2023-08-28 NOTE — DISCHARGE SUMMARY
"[facility]  Hospitalist Discharge Summary      Date of Admission:  8/24/2023  Date of Discharge:  8/28/2023  Discharging Provider: Tom Cheatham MD  Discharge Service: Hospitalist Service    Discharge Diagnoses   Acute on chronic low back pain with right lower extremity radiculopathy  Multilevel lumbar DJD with grade 1 anterolisthesis of L4 on L5.    Clinically Significant Risk Factors     # Obesity: Estimated body mass index is 36.08 kg/m  as calculated from the following:    Height as of this encounter: 1.6 m (5' 3\").    Weight as of this encounter: 92.4 kg (203 lb 11.3 oz).       Follow-ups Needed After Discharge   Follow-up Appointments     Follow-up and recommended labs and tests       Follow up with primary care provider, LU TESFAYE, within 7 days for   hospital follow- up.  Follow up at the neurosurgery clinic as arranged.            Discharge Disposition   Discharged to home  Condition at discharge: Stable    Hospital Course   Andreea Hurd is a 68-year-old woman with history of hyperlipidemia, RASHEED, bronchiectasis, obesity, multilevel lumbar DJD and grade 1 anterolisthesis of L4 on L5 admitted on 8/24/2023 with acute on chronic low back pain with right lower extremity radiculopathy and abnormal urinalysis.    She received L3-4 epidural steroid injection on 8/17/2023.  Lumbar CT revealed lumbar DJD with grade 1 anterolisthesis of L4 on L5.  She received IV methylprednisolone briefly and subsequently underwent L3-4 interlaminar injection on 8/20/2023 per neurosurgeon's recommendation. Neurosurgery team also recommended outpatient physical therapy.     She received IV antibiotics briefly due to concern for UTI.  Renal ultrasound revealed simple right renal cyst.    Pain improved postprocedure and patient is eager to go home today.  She will follow-up with her PCP, Dr. Tesfaye tomorrow, 8/29/2023.     Consultations This Hospital Stay   PHYSICAL THERAPY ADULT IP " CONSULT  OCCUPATIONAL THERAPY ADULT IP CONSULT  CARE MANAGEMENT / SOCIAL WORK IP CONSULT  NEUROSURGERY IP CONSULT  INTERVENTIONAL RADIOLOGY ADULT/PEDS IP CONSULT    Code Status   Full Code    Time Spent on this Encounter   I, Tom Cheatham MD, personally saw the patient today and spent greater than 30 minutes discharging this patient.       Tom Cheatham MD  [address] [address]  Phone: [phone]  Fax: [phone]  ______________________________________________________________________    Physical Exam   Vital Signs: Temp: 97.7  F (36.5  C) Temp src: Oral BP: 127/71 Pulse: 69   Resp: 18 SpO2: 96 % O2 Device: None (Room air)    Weight: 203 lbs 11.28 oz    General appearance: Awake, Alert, Cooperative, not in any obvious distress and appears stated age   HEENT: Normocephalic, atraumatic, conjunctiva clear without icterus and ears without discharge  Lungs: Clear to auscultation bilaterally, no wheezing, good air exchange, normal work of breathing  Cardiovascular: Regular Rate and Rythm, normal apical impulse, normal S1 and S2, no lower extremity edema bilaterally  Abdomen: Soft, non-tender and Non-distended, active bowel sounds  Skin: Skin color, texture normal and bruising or bleeding. No rashes or lesions over face, neck, arms and legs, turgor normal.  Musculoskeletal: No bony deformities or joint tenderness. Normal ROM upon flexion & extension.   Neurologic: Alert & Oriented X 3, Facial symmetry preserved and upper & lower extremities moving well with symmetry  Psychiatric: Calm, normal eye contact and normal affect         Primary Care Physician   LU RODRIGUES    Discharge Orders      Physical Therapy Referral      Activity: Bedrest OUTPATIENT    Bedrest with head of bed flat for 1 hour then discharge. Patient is allowed to have head up for meals and for bathroom privileges only.     Reason for your hospital stay    Acute on chronic low  back pain with right lower extremity radiculopathy  Multilevel lumbar DJD with grade 1 anterolisthesis of L4 on L5.     Follow-up and recommended labs and tests     Follow up with primary care provider, LU RODRIGUES, within 7 days for hospital follow- up.  Follow up at the neurosurgery clinic as arranged.     Activity    Your activity upon discharge: activity as tolerated     Diet    Follow this diet upon discharge: Orders Placed This Encounter      NPO per Anesthesia Guidelines for Procedure/Surgery Except for: Meds       Significant Results and Procedures   Results for orders placed or performed during the hospital encounter of 08/24/23   CT Lumbar Spine w Contrast    Narrative    EXAM: CT LUMBAR SPINE W CONTRAST  LOCATION: [facility]  DATE: 8/24/2023    INDICATION: intractable LBP, right side with radiation to right groin and right anterior thigh.  s p L3 L4 right TF MARIANNE on 8 17.; Low back pain; Low back pain, no complicating feature; No chronic LBP duration >= 3 months  COMPARISON: CT lumbar spine 01/27/2023.  CONTRAST: 90ml isovue 370  TECHNIQUE: Routine CT Lumbar Spine with IV contrast. Multiplanar reformats. Dose reduction techniques were used.    FINDINGS: Nomenclature is based on 5 lumbar vertebral bodies. No acute lumbar spine fracture. There is straightening of the normal lumbar lordosis. Grade 1 anterolisthesis of L4 on L5 measuring 3-4 mm. Mild dextroconvex curvature with apex at L3. Mild   degenerative right lateral listhesis of L3 on L4. Alignment appears unchanged.    Moderate to severe disc height loss L2-L3. There is moderate disc height loss at L1-L2 and L3 on L4 and L4-L5. Multilevel marginal endplate osteophytes. Multilevel degenerative facet disease, which is severe bilaterally at L4-L5 and moderate to severe   bilaterally at L5-S1. Multilevel disc bulges are present with and without posterior endplate osteophytic ridging. There appears to be moderate to severe or  severe spinal canal stenosis at L4-L5. There is at least moderate if not moderate to severe   appearing spinal canal stenosis at L2-L3 and L3-L4. There is at least moderate left L3-L4 neural foraminal stenosis. There is at least moderate bilateral neural foraminal stenosis at L4-L5, greater on the right. Relatively lesser degrees of neural   foraminal narrowing seen elsewhere.    Multiple T2 hyperintense renal lesions in the right, the largest of which measures at least 37 mm in axial plane, probably representing cysts, although technically incompletely evaluated. Heterogeneous soft tissue density seen in the dependent aspect of   the stomach, presumably ingested debris. The visualized paraspinous soft tissues otherwise appear grossly unremarkable. Scattered atherosclerotic calcifications of the aorta. Retroaortic left renal vein.      Impression    IMPRESSION:  1. No acute osseous abnormality.  2. Unchanged grade 1 anterolisthesis of L4 on L5. Alignment is unchanged.  3. Degenerative changes, as described, with varying degrees of associated spinal canal and neural foraminal stenosis. Please see the body of the report for additional details.   US Renal Complete Non-Vascular    Narrative    EXAM: US RENAL COMPLETE NON-VASCULAR  LOCATION: [facility]  DATE: 8/25/2023    INDICATION: Right renal lesion reported by neurosurgeon on imaging review  COMPARISON: 08/24/2023  TECHNIQUE: Routine Bilateral Renal and Bladder Ultrasound.    FINDINGS:    RIGHT KIDNEY: 11 cm. Normal without hydronephrosis or masses. Lower pole cyst measuring up to 5.3 cm.    LEFT KIDNEY: 11 cm. Normal without hydronephrosis or masses.     BLADDER: Normal.      Impression    IMPRESSION:  1.  Partially imaged right renal lesion on CT lumbar spine corresponds to a simple cyst.   IR Translaminar Epidural Lumbar Inj Incl Imaging    Narrative    1. ATTEMPTED L4-5 LEVEL INTERLAMINAR EPIDURAL ANESTHETIC AND STEROID INJECTION  2.  L3-L4 LEVEL INTERLAMINAR EPIDURAL ANESTHETIC AND STEROID INJECTION   3. FLUOROSCOPIC GUIDANCE    PROCEDURE DATE: 8/28/2023 10:13 AM CDT     INDICATION: Severe low back and right lower extremity pain. Listhesis at the L4-5 level. Right leg pain in an L3 distribution. Patient had positive anesthetic response to right L3 transforaminal epidural steroid injection. L4-5 interlaminar MARIANNE requested   to see if this gives her better long term response.     CONSENT: The procedure and its indications, major risks, benefits, and alternatives were discussed. Risks including, but not limited to, pain, hemorrhage, arterial embolus, allergic reaction, infection, nerve damage and spinal cord damage were discussed.   Understanding was acknowledged, and a signed informed consent was obtained. During the procedure, when it noted that the L4-L5 level injection was not possible, alternative site injection at the L3-L4 level was discussed with patient. She agreed to the   alternative level injection.    INJECTION:  1 ml of 10 mg per mL dexamethasone preservative free.    6 ml of 1% lidocaine preservative free.     FLUOROSCOPIC TIME: 4.7 minutes    IMAGES: 5     PROCEDURE: The patient was placed prone upon the fluoroscopy table. Skin of the back was prepped and draped in sterile fashion. Real-time fluoroscopy was used to obtain working projections for the subsequent needle advancement. Overlying skin and   subcutaneous tissues were infiltrated with 1% lidocaine. Using direct fluoroscopic visualization and a posterior midline approach a 22-gauge Tuohy needle was advanced into the region of the posterior epidural space at the L4-5 level. Loss of resistance   technique was used. There is significant listhesis at the L4-L5 level. The needle was advanced posteriorly within the spinal canal to the point where there is seemed as if dural puncture was likely if we continued to advance. There was no loss   resistance. Was decided that L4-L5 level  interlaminar epidural was not feasible. The skin over the L3-L4 level was infiltrated with 1% lidocaine. Using direct fluoroscopic visualization and a posterior midline approach a 22-gauge Tuohy needle was   advanced into the posterior epidural space at the L3-L4 level. Loss of resistance technique was used. Needle aspiration shows no blood return. 1 mL of Ultravist 300 M contrast was injected in order to confirm satisfactory position. This documented   nonvascular needle tip placement within the posterior epidural space. The needle bevel was turned inferiorly and slightly to the right during injection. A mixture of 6 mL 1% preservative free lidocaine and 1 mL preservative free dexamethasone (10 mg/mL)   was then infused through the needle tip into the posterior epidural location. As expected, subsequent radiographic images reveal that infusion of this injectate diluted the contrast. Needle was removed. The procedure appeared well tolerated and was   without apparent complication.     Pain severity:  Pre-procedure: 10/10  Post-procedure: 5/10  Pain reduction: 50%        Impression    CONCLUSION:  1. And attempt to perform an intralaminar epidural injection the L4-L5 level was not successful. The posterior epidural space is significantly compressed/minimized by spondylolisthesis at this level.   2. Technically successful L3-L4 interlaminar epidural steroid and anesthetic injection.            Discharge Medications   Current Discharge Medication List        START taking these medications    Details   methocarbamol (ROBAXIN) 500 MG tablet Take 1 tablet (500 mg) by mouth 4 times daily as needed for muscle spasms  Qty: 40 tablet, Refills: 0    Associated Diagnoses: Acute right-sided low back pain, unspecified whether sciatica present; Intractable pain; Advanced directives, counseling/discussion; CARDIOVASCULAR SCREENING; LDL GOAL LESS THAN 160      oxyCODONE (ROXICODONE) 5 MG tablet Take 1 tablet (5 mg) by mouth every 6  hours as needed for pain  Qty: 12 tablet, Refills: 0    Associated Diagnoses: Acute right-sided low back pain, unspecified whether sciatica present; Intractable pain; Advanced directives, counseling/discussion; CARDIOVASCULAR SCREENING; LDL GOAL LESS THAN 160      polyethylene glycol (MIRALAX) 17 g packet Take 17 g by mouth daily as needed for constipation  Qty: 10 packet, Refills: 0    Associated Diagnoses: Acute right-sided low back pain, unspecified whether sciatica present; Intractable pain; Advanced directives, counseling/discussion; CARDIOVASCULAR SCREENING; LDL GOAL LESS THAN 160           CONTINUE these medications which have NOT CHANGED    Details   acetaminophen (TYLENOL) 500 MG tablet Take 1,000 mg by mouth every 8 hours      rosuvastatin (CRESTOR) 20 MG tablet Take 20 mg by mouth every evening      umeclidinium-vilanterol (ANORO ELLIPTA) 62.5-25 MCG/ACT oral inhaler Inhale 1 puff into the lungs daily           STOP taking these medications       ibuprofen (ADVIL/MOTRIN) 200 MG tablet Comments:   Reason for Stopping:             Allergies   No Known Allergies

## 2023-08-28 NOTE — PROGRESS NOTES
Care Management Discharge Note    Discharge Date: 08/28/2023       Discharge Disposition: Home    Discharge Services: None    Discharge DME: None    Discharge Transportation: car, drives self    Private pay costs discussed: Not applicable    PAS Confirmation Code: not applicable  Patient/family educated on Medicare website which has current facility and service quality ratings: yes    Education Provided on the Discharge Plan: Yes  Persons Notified of Discharge Plans: patient   Patient/Family in Agreement with the Plan: yes    Handoff Referral Completed: NA    Additional Information:    SW reviewed chart.  Pt from home with spouse.  Independent at baseline.  Anticipate return home with support from family upon medical readiness for discharge.    Pt discharging home to prior living environment.  No CM needs identified.  Family transporting.     CESILIA Butt

## 2023-08-28 NOTE — PLAN OF CARE
Problem: Pain Acute  Goal: Optimal Pain Control and Function  Intervention: Develop Pain Management Plan  Recent Flowsheet Documentation  Taken 8/27/2023 1815 by Jo Ann Gordillo RN  Pain Management Interventions: medication (see MAR)  Taken 8/27/2023 1513 by Jo Ann Gordillo RN  Pain Management Interventions: medication (see MAR)     Problem: Plan of Care - These are the overarching goals to be used throughout the patient stay.    Goal: Readiness for Transition of Care  Outcome: Progressing     Problem: Mobility Impairment  Goal: Optimal Mobility  Outcome: Progressing  Intervention: Optimize Mobility  Recent Flowsheet Documentation  Taken 8/27/2023 1815 by Jo Ann Gordillo RN  Assistive Device Utilized:   walker   gait belt  Activity Management:   activity adjusted per tolerance   activity encouraged  Positioning/Transfer Devices:   pillows   in use  Taken 8/27/2023 1548 by Jo Ann Gordillo RN  Assistive Device Utilized:   walker   gait belt  Activity Management:   activity adjusted per tolerance   activity encouraged  Positioning/Transfer Devices:   pillows   in use   Goal Outcome Evaluation:        Pt alert and orient times four, on room air. Pt ambulates to bathroom, on standby assistive with walker. Pt on oral PRN Dilaudid tablet 4 mg and scheduled tylenol tablet 975 mg for pain. She is scheduled to receive an epidural steroid injection tomorrow. Pt still has intractable pain and has stated the Dilaudid helps a little. She has a home CPAP with her at bedside.

## 2023-08-29 NOTE — PROGRESS NOTES
Occupational Therapy Discharge Summary    Reason for therapy discharge:    All goals and outcomes met, no further needs identified.    Progress towards therapy goal(s). See goals on Care Plan in [facility] electronic health record for goal details.  Goals partially met.  Barriers to achieving goals:   limited tolerance for therapy.    Therapy recommendation(s):    Continued therapy is recommended.  Rationale/Recommendations:  for further progression.

## 2025-04-16 ENCOUNTER — TRANSCRIBE ORDERS (OUTPATIENT)
Facility: CLINIC | Age: 70
End: 2025-04-16
Payer: COMMERCIAL

## 2025-04-16 DIAGNOSIS — J44.9 CHRONIC OBSTRUCTIVE PULMONARY DISEASE, UNSPECIFIED COPD TYPE (H): Primary | ICD-10-CM

## 2025-05-19 ENCOUNTER — TRANSCRIBE ORDERS (OUTPATIENT)
Facility: CLINIC | Age: 70
End: 2025-05-19
Payer: COMMERCIAL

## 2025-05-19 DIAGNOSIS — J44.9 COPD, MODERATE (H): Primary | ICD-10-CM

## 2025-06-17 ENCOUNTER — HOSPITAL ENCOUNTER (OUTPATIENT)
Dept: CARDIAC REHAB | Facility: HOSPITAL | Age: 70
Discharge: HOME | End: 2025-06-17
Attending: INTERNAL MEDICINE
Payer: MEDICARE

## 2025-06-17 PROCEDURE — 94625 PHY/QHP OP PULM RHB W/O MNTR: CPT

## 2025-06-24 ENCOUNTER — HOSPITAL ENCOUNTER (OUTPATIENT)
Dept: CARDIAC REHAB | Facility: HOSPITAL | Age: 70
Discharge: HOME | End: 2025-06-24
Attending: INTERNAL MEDICINE
Payer: MEDICARE

## 2025-06-24 PROCEDURE — 94625 PHY/QHP OP PULM RHB W/O MNTR: CPT

## 2025-07-01 ENCOUNTER — HOSPITAL ENCOUNTER (OUTPATIENT)
Dept: CARDIAC REHAB | Facility: HOSPITAL | Age: 70
Discharge: HOME | End: 2025-07-01
Attending: INTERNAL MEDICINE
Payer: MEDICARE

## 2025-07-01 PROCEDURE — 94625 PHY/QHP OP PULM RHB W/O MNTR: CPT

## 2025-07-08 ENCOUNTER — HOSPITAL ENCOUNTER (OUTPATIENT)
Dept: CARDIAC REHAB | Facility: HOSPITAL | Age: 70
Discharge: HOME | End: 2025-07-08
Attending: INTERNAL MEDICINE
Payer: MEDICARE

## 2025-07-08 PROCEDURE — 94625 PHY/QHP OP PULM RHB W/O MNTR: CPT

## 2025-07-22 ENCOUNTER — APPOINTMENT (OUTPATIENT)
Dept: RADIOLOGY | Facility: HOSPITAL | Age: 70
End: 2025-07-22
Attending: EMERGENCY MEDICINE
Payer: MEDICARE

## 2025-07-22 ENCOUNTER — HOSPITAL ENCOUNTER (EMERGENCY)
Facility: HOSPITAL | Age: 70
Discharge: HOME | End: 2025-07-22
Attending: EMERGENCY MEDICINE
Payer: MEDICARE

## 2025-07-22 VITALS
HEART RATE: 75 BPM | RESPIRATION RATE: 18 BRPM | OXYGEN SATURATION: 98 % | WEIGHT: 196 LBS | DIASTOLIC BLOOD PRESSURE: 70 MMHG | TEMPERATURE: 98.1 F | SYSTOLIC BLOOD PRESSURE: 118 MMHG | BODY MASS INDEX: 34.73 KG/M2 | HEIGHT: 63 IN

## 2025-07-22 DIAGNOSIS — M54.41 ACUTE RIGHT-SIDED LOW BACK PAIN WITH RIGHT-SIDED SCIATICA: ICD-10-CM

## 2025-07-22 PROCEDURE — 99284 EMERGENCY DEPT VISIT MOD MDM: CPT | Performed by: EMERGENCY MEDICINE

## 2025-07-22 PROCEDURE — 250N000013 HC RX MED GY IP 250 OP 250 PS 637: Performed by: EMERGENCY MEDICINE

## 2025-07-22 PROCEDURE — 73502 X-RAY EXAM HIP UNI 2-3 VIEWS: CPT

## 2025-07-22 PROCEDURE — 96372 THER/PROPH/DIAG INJ SC/IM: CPT | Performed by: EMERGENCY MEDICINE

## 2025-07-22 PROCEDURE — 250N000011 HC RX IP 250 OP 636: Performed by: EMERGENCY MEDICINE

## 2025-07-22 RX ORDER — BACLOFEN 10 MG/1
10 TABLET ORAL ONCE
Status: COMPLETED | OUTPATIENT
Start: 2025-07-22 | End: 2025-07-22

## 2025-07-22 RX ORDER — HYDROMORPHONE HYDROCHLORIDE 2 MG/1
2-4 TABLET ORAL EVERY 6 HOURS PRN
Qty: 20 TABLET | Refills: 0 | Status: SHIPPED | OUTPATIENT
Start: 2025-07-22

## 2025-07-22 RX ORDER — BACLOFEN 10 MG/1
10 TABLET ORAL 3 TIMES DAILY PRN
Qty: 20 TABLET | Refills: 0 | Status: SHIPPED | OUTPATIENT
Start: 2025-07-22

## 2025-07-22 RX ORDER — LIDOCAINE 50 MG/G
1 PATCH TOPICAL EVERY 24 HOURS
Qty: 15 PATCH | Refills: 0 | Status: SHIPPED | OUTPATIENT
Start: 2025-07-22

## 2025-07-22 RX ADMIN — BACLOFEN 10 MG: 10 TABLET ORAL at 11:48

## 2025-07-22 RX ADMIN — HYDROMORPHONE HYDROCHLORIDE 1 MG: 1 INJECTION, SOLUTION INTRAMUSCULAR; INTRAVENOUS; SUBCUTANEOUS at 10:36

## 2025-07-22 ASSESSMENT — COLUMBIA-SUICIDE SEVERITY RATING SCALE - C-SSRS
2. HAVE YOU ACTUALLY HAD ANY THOUGHTS OF KILLING YOURSELF IN THE PAST MONTH?: NO
1. IN THE PAST MONTH, HAVE YOU WISHED YOU WERE DEAD OR WISHED YOU COULD GO TO SLEEP AND NOT WAKE UP?: NO
6. HAVE YOU EVER DONE ANYTHING, STARTED TO DO ANYTHING, OR PREPARED TO DO ANYTHING TO END YOUR LIFE?: NO

## 2025-07-22 ASSESSMENT — ACTIVITIES OF DAILY LIVING (ADL)
ADLS_ACUITY_SCORE: 61
ADLS_ACUITY_SCORE: 61

## 2025-07-22 NOTE — ED PROVIDER NOTES
EMERGENCY DEPARTMENT ENCOUNTER      NAME: Andreea Hurd  AGE: 70 year old female  YOB: 1955  MRN: [id number]  EVALUATION DATE & TIME: 2025  9:35 AM    PCP: Meek Tesfaye    ED PROVIDER: Josette Segovia M.D.      Chief Complaint   Patient presents with    Back Pain       FINAL IMPRESSION:  1. Acute right-sided low back pain with right-sided sciatica        ED COURSE & MEDICAL DECISION MAKIN. Back pain.  Acute on chronic in nature, atraumatic. Differentials considered include MSK pain, degenerative disc disease, fracture, cauda equina, discitits or osteomyelitis, spinal cord pathology, UTI.  Neurovascularly intact.  R hip pain in inguinal region, hx of hip replacement remotely. XR obtained to rule out hardware abnormality. I read XR and no acute process seen.  I have no clinical concern for any dangerous etiology for back pain. No risk factors such as IVDA, immunocompromise, hx of back surgery. No additional symptoms such as fever or dysuria.  Pain control as needed including PO pain meds, muscle relaxants.  DC home with outpatient follow up and return precautions given.    10:28 AM I met with the patient to gather history and to perform my initial exam. I discussed the plan for care while in the Emergency Department.  ED Course as of 25e 2025   1138 I read hip XR. No acute abnormality.   1140 I reviewed , no recent fills.  I reviewed epidural spinal injection visit 2025, CT lumbar spine 2025     Pertinent Labs & Imaging studies reviewed. (See chart for details).      Medical Decision Making  I obtained history from patient and patient's family member, I reviewed the EMR as documented in HPI, ED course, and chart review section above and below, Care impacted by chronic conditions/past medical history as documented in HPI, ED course, and chart review section above below,, I independently interpreted Radiological studies as documented in Radiology section  below. See radiology report for final interpretation., and I discussed the care with another health care provider: NA    Discharge. I prescribed additional prescription strength medication(s) as charted. See documentation for any additional details.    MIPS (CTPE, Dental pain, Scott, Sinusitis, Asthma/COPD, Head Trauma): Low Back Pain: Opiates were administered or prescribed for suspected or known herniated disc, sciatica, or radiculopathy.  Per American College of Radiology guidelines, high tech imaging was avoided to reduce radiation exposure and potentially harmful treatments.     SEPSIS: None    At the conclusion of the encounter I discussed the results of all of the tests and the disposition. The questions were answered. The patient or family acknowledged understanding and was agreeable with the care plan.      CRITICAL CARE:  N/A    HPI    Patient information was obtained from: patient and patient's family member.    Use of : N/A.       Andreea Hurd is a 70 year old female who presents with back pain. The patient reports she started experiencing lower back pain last night after bending over to pick something up. She notes a history of similar back pain 2 years ago, receiving 3 epidurals, and having the pain resolve until last night. The patient notes the pain increases with ambulation and occasionally shoots down her right leg to her right knee. She states the muscle relaxer, tylenol, ibuprofen, and lidocaine patches provide little relief in pain. She has a history of right hip replacement. The patient denies having incontinence, numbness, tingling, fever, chills, nausea, vomiting, diarrhea, chest pain, shortness of breath, buttock pain, and history of back surgery.    Per Chart Review: I reviewed patient's history, , see ED course above.      REVIEW OF SYSTEMS  All other systems negative unless noted in HPI.    PAST MEDICAL HISTORY:  Past Medical History:   Diagnosis Date    Carpal tunnel  syndrome 9/17/1998    bilateral    Decreased hearing     Rt Ear    Ex-smoker     quit in 2007    Fainted 9/10/1998    several times in life    Hot flashes, menopausal 6/2001    x1year    NONSPECIFIC MEDICAL HISTORY     RT glass eye       PAST SURGICAL HISTORY:  Past Surgical History:   Procedure Laterality Date    EYE SURGERY  Age 3    artificial eye,RT/eyelid    IR TRANSLAMINAR EPIDURAL LUMBAR INJ INCL IMAGING  8/28/2023    TUBAL LIGATION  Age 29         CURRENT MEDICATIONS:    No current facility-administered medications for this encounter.     Current Outpatient Medications   Medication Sig Dispense Refill    baclofen (LIORESAL) 10 MG tablet Take 1 tablet (10 mg) by mouth 3 times daily as needed for other (back pain). 20 tablet 0    HYDROmorphone (DILAUDID) 2 MG tablet Take 1-2 tablets (2-4 mg) by mouth every 6 hours as needed for pain. 20 tablet 0    lidocaine (LIDODERM) 5 % patch Place 1 patch over 12 hours onto the skin every 24 hours. To prevent lidocaine toxicity, patient should be patch free for 12 hrs daily. 15 patch 0    acetaminophen (TYLENOL) 500 MG tablet Take 1,000 mg by mouth every 8 hours      methocarbamol (ROBAXIN) 500 MG tablet Take 1 tablet (500 mg) by mouth 4 times daily as needed for muscle spasms 40 tablet 0    polyethylene glycol (MIRALAX) 17 g packet Take 17 g by mouth daily as needed for constipation 10 packet 0    rosuvastatin (CRESTOR) 20 MG tablet Take 20 mg by mouth every evening      umeclidinium-vilanterol (ANORO ELLIPTA) 62.5-25 MCG/ACT oral inhaler Inhale 1 puff into the lungs daily           ALLERGIES:  No Known Allergies    FAMILY HISTORY:  Family History   Problem Relation Age of Onset    Heart Disease Father         MI before age 55 years    Heart Disease Brother     Heart Disease Brother        SOCIAL HISTORY:  Social History     Socioeconomic History    Marital status:    Tobacco Use    Smoking status: Never    Smokeless tobacco: Never   Substance and Sexual Activity  "   Alcohol use: Yes     Comment: occ.    Drug use: No    Sexual activity: Yes     Partners: Male     Social Drivers of Health     Financial Resource Strain: Low Risk  (4/18/2025)    [facility]    Financial Resource Strain     Difficulty of Paying Living Expenses: 3   Food Insecurity: No Food Insecurity (4/18/2025)    Received from [facility]    Food Insecurity     Do you worry your food will run out before you are able to buy more?: 1   Transportation Needs: No Transportation Needs (4/18/2025)    Received from [facility]    Transportation Needs     Does lack of transportation keep you from medical appointments?: 1     Does lack of transportation keep you from work, meetings or getting things that you need?: 1   Social Connections: Socially Integrated (4/18/2025)    Received from [facility]    Social Connections     Do you often feel lonely or isolated from those around you?: 0   Housing Stability: Low Risk  (4/18/2025)    [facility]    Housing Stability     What is your housing situation today?: 1       VITALS:  Patient Vitals for the past 24 hrs:   BP Temp Temp src Pulse Resp SpO2 Height Weight   07/22/25 1158 118/70 98.1  F (36.7  C) Oral 75 18 98 % -- --   07/22/25 1030 -- -- -- 69 -- 96 % -- --   07/22/25 1015 120/72 -- -- 68 -- 95 % -- --   07/22/25 1000 137/76 -- -- 70 -- 96 % -- --   07/22/25 0945 -- -- -- 77 -- 95 % -- --   07/22/25 0928 (!) 158/72 97.5  F (36.4  C) Oral 75 16 96 % 1.6 m (5' 3\") 88.9 kg (196 lb)       PHYSICAL EXAM    VITAL SIGNS: /70   Pulse 75   Temp 98.1  F (36.7  C) (Oral)   Resp 18   Ht 1.6 m (5' 3\")   Wt 88.9 kg (196 lb)   SpO2 98%   BMI 34.72 kg/m    Physical Exam  Vitals and nursing note reviewed.   Constitutional:       General: She is in acute distress.      Appearance: She " is not toxic-appearing.   Eyes:      General: No scleral icterus.        Right eye: No discharge.         Left eye: No discharge.   Cardiovascular:      Rate and Rhythm: Normal rate and regular rhythm.   Pulmonary:      Effort: Pulmonary effort is normal. No respiratory distress.      Breath sounds: Normal breath sounds.   Abdominal:      General: There is no distension.      Palpations: Abdomen is soft.      Tenderness: There is no abdominal tenderness.   Musculoskeletal:         General: No swelling or deformity.      Cervical back: Neck supple. No rigidity.      Comments: Mild pain to range of motion of the right hip without shortening of the right leg, no significant tenderness to palpation of the right inguinal ligament.  No midline T or L-spine tenderness to palpation.  No asymmetry noted.  No step-off palpable to T or L-spine.   Skin:     General: Skin is warm and dry.      Capillary Refill: Capillary refill takes less than 2 seconds.      Findings: No bruising or erythema.   Neurological:      General: No focal deficit present.      Mental Status: She is alert and oriented to person, place, and time. Mental status is at baseline.      Comments: No slurred speech, following commands spontaneously. No facial droop.   Psychiatric:         Mood and Affect: Mood normal.         Behavior: Behavior normal.         LABS  Labs Ordered and Resulted from Time of ED Arrival to Time of ED Departure - No data to display      RADIOLOGY  XR Pelvis and Hip Right 2 Views   Final Result   IMPRESSION:    Degenerative changes in the spine. Right total hip arthroplasty. There is no evidence of hardware loosening or periprosthetic fracture.         I have independently reviewed the above image. See radiology report for detail.      EKG:    NA       PROCEDURES:  N/A      MEDICATIONS GIVEN IN THE EMERGENCY:  Medications   HYDROmorphone (DILAUDID) injection 1 mg (1 mg Intramuscular $Given 7/22/25 1036)   baclofen (LIORESAL) tablet 10  mg (10 mg Oral $Given 7/22/25 1148)       NEW PRESCRIPTIONS STARTED AT TODAY'S ER VISIT  Discharge Medication List as of 7/22/2025 11:49 AM        START taking these medications    Details   baclofen (LIORESAL) 10 MG tablet Take 1 tablet (10 mg) by mouth 3 times daily as needed for other (back pain)., Disp-20 tablet, R-0, E-Prescribe      HYDROmorphone (DILAUDID) 2 MG tablet Take 1-2 tablets (2-4 mg) by mouth every 6 hours as needed for pain., Disp-20 tablet, R-0, E-Prescribe      lidocaine (LIDODERM) 5 % patch Place 1 patch over 12 hours onto the skin every 24 hours. To prevent lidocaine toxicity, patient should be patch free for 12 hrs daily.Disp-15 patch, [id number]              I, Tom Crane, am serving as a scribe to document services personally performed by Josette Segovia MD, based on my observations and the provider's statements to me.  I, Josette Segovia MD, attest that Tom Crane is acting in a scribe capacity, has observed my performance of the services and has documented them in accordance with my direction.     Josette Segovia MD  Emergency Medicine  [facility] EMERGENCY DEPARTMENT  [facility] [facility] [id number] [phone]  Dept: [phone]             Josette Segovia MD  07/22/25 5082

## 2025-07-22 NOTE — DISCHARGE INSTRUCTIONS
Your hip X ray is stable today.  Please follow up with your primary care doctor for outpatient epidural injection.

## 2025-07-22 NOTE — ED TRIAGE NOTES
Patient presents here with lower back pain that occurred last night when she was bending forward. Similar episode occurred about three weeks ago and she was evaluated by her primary provider, who ordered a steroid epidural.     Triage Assessment (Adult)       Row Name 07/22/25 0929          Triage Assessment    Airway WDL WDL        Respiratory WDL    Respiratory WDL WDL        Skin Circulation/Temperature WDL    Skin Circulation/Temperature WDL WDL        Cardiac WDL    Cardiac WDL WDL        Peripheral/Neurovascular WDL    Peripheral Neurovascular WDL WDL        Cognitive/Neuro/Behavioral WDL    Cognitive/Neuro/Behavioral WDL WDL

## (undated) RX ORDER — LIDOCAINE HYDROCHLORIDE 10 MG/ML
INJECTION, SOLUTION EPIDURAL; INFILTRATION; INTRACAUDAL; PERINEURAL
Status: DISPENSED
Start: 2023-08-28

## (undated) RX ORDER — DEXAMETHASONE SODIUM PHOSPHATE 10 MG/ML
INJECTION, SOLUTION INTRAMUSCULAR; INTRAVENOUS
Status: DISPENSED
Start: 2023-08-28